# Patient Record
Sex: MALE | Race: WHITE | NOT HISPANIC OR LATINO | Employment: FULL TIME | URBAN - METROPOLITAN AREA
[De-identification: names, ages, dates, MRNs, and addresses within clinical notes are randomized per-mention and may not be internally consistent; named-entity substitution may affect disease eponyms.]

---

## 2017-07-21 ENCOUNTER — ALLSCRIPTS OFFICE VISIT (OUTPATIENT)
Dept: OTHER | Facility: OTHER | Age: 32
End: 2017-07-21

## 2018-01-12 VITALS
DIASTOLIC BLOOD PRESSURE: 86 MMHG | HEIGHT: 72 IN | BODY MASS INDEX: 37.79 KG/M2 | SYSTOLIC BLOOD PRESSURE: 128 MMHG | RESPIRATION RATE: 16 BRPM | HEART RATE: 70 BPM | WEIGHT: 279 LBS | TEMPERATURE: 97.6 F

## 2018-03-15 PROBLEM — K21.9 GE REFLUX: Status: ACTIVE | Noted: 2017-07-21

## 2018-03-15 RX ORDER — RANITIDINE 150 MG/1
1 TABLET ORAL DAILY PRN
COMMUNITY
Start: 2017-07-21 | End: 2019-09-16 | Stop reason: SDUPTHER

## 2018-03-16 ENCOUNTER — OFFICE VISIT (OUTPATIENT)
Dept: FAMILY MEDICINE CLINIC | Facility: CLINIC | Age: 33
End: 2018-03-16
Payer: COMMERCIAL

## 2018-03-16 VITALS
HEART RATE: 84 BPM | WEIGHT: 299 LBS | HEIGHT: 72 IN | BODY MASS INDEX: 40.5 KG/M2 | TEMPERATURE: 97.6 F | DIASTOLIC BLOOD PRESSURE: 84 MMHG | RESPIRATION RATE: 20 BRPM | SYSTOLIC BLOOD PRESSURE: 124 MMHG

## 2018-03-16 DIAGNOSIS — Z13.89 SCREENING FOR CARDIOVASCULAR, RESPIRATORY, AND GENITOURINARY DISEASES: ICD-10-CM

## 2018-03-16 DIAGNOSIS — K21.9 GASTROESOPHAGEAL REFLUX DISEASE, ESOPHAGITIS PRESENCE NOT SPECIFIED: Primary | ICD-10-CM

## 2018-03-16 DIAGNOSIS — G47.9 SLEEP DISORDER: ICD-10-CM

## 2018-03-16 DIAGNOSIS — Z13.6 SCREENING FOR CARDIOVASCULAR, RESPIRATORY, AND GENITOURINARY DISEASES: ICD-10-CM

## 2018-03-16 DIAGNOSIS — Z13.1 SCREENING FOR DIABETES MELLITUS (DM): ICD-10-CM

## 2018-03-16 DIAGNOSIS — Z13.83 SCREENING FOR CARDIOVASCULAR, RESPIRATORY, AND GENITOURINARY DISEASES: ICD-10-CM

## 2018-03-16 PROCEDURE — 1036F TOBACCO NON-USER: CPT | Performed by: FAMILY MEDICINE

## 2018-03-16 PROCEDURE — 3008F BODY MASS INDEX DOCD: CPT | Performed by: FAMILY MEDICINE

## 2018-03-16 PROCEDURE — 99214 OFFICE O/P EST MOD 30 MIN: CPT | Performed by: FAMILY MEDICINE

## 2018-03-16 RX ORDER — OMEPRAZOLE 40 MG/1
40 CAPSULE, DELAYED RELEASE ORAL DAILY
Qty: 30 CAPSULE | Refills: 5 | Status: SHIPPED | OUTPATIENT
Start: 2018-03-16 | End: 2019-01-11 | Stop reason: SDUPTHER

## 2018-03-16 RX ORDER — OMEPRAZOLE 20 MG/1
20 CAPSULE, DELAYED RELEASE ORAL DAILY
COMMUNITY
End: 2018-03-16 | Stop reason: SDUPTHER

## 2019-01-11 DIAGNOSIS — K21.9 GASTROESOPHAGEAL REFLUX DISEASE, ESOPHAGITIS PRESENCE NOT SPECIFIED: ICD-10-CM

## 2019-01-11 RX ORDER — OMEPRAZOLE 40 MG/1
CAPSULE, DELAYED RELEASE ORAL
Qty: 30 CAPSULE | Refills: 0 | Status: SHIPPED | OUTPATIENT
Start: 2019-01-11 | End: 2019-03-28 | Stop reason: SDUPTHER

## 2019-03-28 DIAGNOSIS — K21.9 GASTROESOPHAGEAL REFLUX DISEASE, ESOPHAGITIS PRESENCE NOT SPECIFIED: ICD-10-CM

## 2019-03-28 RX ORDER — OMEPRAZOLE 40 MG/1
40 CAPSULE, DELAYED RELEASE ORAL DAILY
Qty: 30 CAPSULE | Refills: 0 | Status: SHIPPED | OUTPATIENT
Start: 2019-03-28 | End: 2019-03-28 | Stop reason: SDUPTHER

## 2019-03-28 RX ORDER — OMEPRAZOLE 40 MG/1
40 CAPSULE, DELAYED RELEASE ORAL DAILY
Qty: 90 CAPSULE | Refills: 0 | Status: SHIPPED | OUTPATIENT
Start: 2019-03-28 | End: 2019-06-29 | Stop reason: SDUPTHER

## 2019-03-28 NOTE — TELEPHONE ENCOUNTER
Pt scheduled for Tuesday 4/2 wants to know if you can now call in 90 day supply since his insurance wont pay for 30 day supplies of this medication

## 2019-04-03 ENCOUNTER — TELEPHONE (OUTPATIENT)
Dept: FAMILY MEDICINE CLINIC | Facility: CLINIC | Age: 34
End: 2019-04-03

## 2019-06-29 DIAGNOSIS — K21.9 GASTROESOPHAGEAL REFLUX DISEASE, ESOPHAGITIS PRESENCE NOT SPECIFIED: ICD-10-CM

## 2019-06-29 RX ORDER — OMEPRAZOLE 40 MG/1
40 CAPSULE, DELAYED RELEASE ORAL DAILY
Qty: 30 CAPSULE | Refills: 0 | Status: SHIPPED | OUTPATIENT
Start: 2019-06-29 | End: 2019-09-16 | Stop reason: SDUPTHER

## 2019-06-29 RX ORDER — OMEPRAZOLE 40 MG/1
CAPSULE, DELAYED RELEASE ORAL
Qty: 30 CAPSULE | Refills: 0 | Status: SHIPPED | OUTPATIENT
Start: 2019-06-29 | End: 2019-06-29 | Stop reason: SDUPTHER

## 2019-07-17 DIAGNOSIS — K21.9 GASTROESOPHAGEAL REFLUX DISEASE, ESOPHAGITIS PRESENCE NOT SPECIFIED: ICD-10-CM

## 2019-07-17 RX ORDER — OMEPRAZOLE 40 MG/1
40 CAPSULE, DELAYED RELEASE ORAL DAILY
Qty: 90 CAPSULE | Refills: 1 | OUTPATIENT
Start: 2019-07-17

## 2019-07-21 DIAGNOSIS — K21.9 GASTROESOPHAGEAL REFLUX DISEASE, ESOPHAGITIS PRESENCE NOT SPECIFIED: ICD-10-CM

## 2019-07-21 RX ORDER — OMEPRAZOLE 40 MG/1
CAPSULE, DELAYED RELEASE ORAL
Qty: 90 CAPSULE | Refills: 0 | OUTPATIENT
Start: 2019-07-21

## 2019-08-07 ENCOUNTER — TELEPHONE (OUTPATIENT)
Dept: FAMILY MEDICINE CLINIC | Facility: CLINIC | Age: 34
End: 2019-08-07

## 2019-08-07 NOTE — TELEPHONE ENCOUNTER
LMOM advising patient know that he will need to call office to schedule a f/u appointment in order to get anymore refills     Lillie Hansen MA

## 2019-08-07 NOTE — TELEPHONE ENCOUNTER
Received a fax from [address] [facility] for a refill on pts Omeprazole  Last refill was refused because pt needs a follow up and has not been seen  We will need to call to have him schedule an appointment   iRc Cortés

## 2019-09-16 ENCOUNTER — OFFICE VISIT (OUTPATIENT)
Dept: FAMILY MEDICINE CLINIC | Facility: CLINIC | Age: 34
End: 2019-09-16
Payer: COMMERCIAL

## 2019-09-16 VITALS
TEMPERATURE: 97.8 F | DIASTOLIC BLOOD PRESSURE: 96 MMHG | HEIGHT: 72 IN | RESPIRATION RATE: 16 BRPM | BODY MASS INDEX: 39.55 KG/M2 | WEIGHT: 292 LBS | HEART RATE: 60 BPM | SYSTOLIC BLOOD PRESSURE: 124 MMHG

## 2019-09-16 DIAGNOSIS — Z13.89 SCREENING FOR CARDIOVASCULAR, RESPIRATORY, AND GENITOURINARY DISEASES: ICD-10-CM

## 2019-09-16 DIAGNOSIS — Z00.00 HEALTHCARE MAINTENANCE: Primary | ICD-10-CM

## 2019-09-16 DIAGNOSIS — Z13.83 SCREENING FOR CARDIOVASCULAR, RESPIRATORY, AND GENITOURINARY DISEASES: ICD-10-CM

## 2019-09-16 DIAGNOSIS — R03.0 ELEVATED BP WITHOUT DIAGNOSIS OF HYPERTENSION: ICD-10-CM

## 2019-09-16 DIAGNOSIS — Z13.1 SCREENING FOR DIABETES MELLITUS (DM): ICD-10-CM

## 2019-09-16 DIAGNOSIS — K21.9 GASTROESOPHAGEAL REFLUX DISEASE, ESOPHAGITIS PRESENCE NOT SPECIFIED: ICD-10-CM

## 2019-09-16 DIAGNOSIS — E66.9 OBESITY (BMI 30-39.9): ICD-10-CM

## 2019-09-16 DIAGNOSIS — G47.9 SLEEP DISORDER: ICD-10-CM

## 2019-09-16 DIAGNOSIS — Z13.6 SCREENING FOR CARDIOVASCULAR, RESPIRATORY, AND GENITOURINARY DISEASES: ICD-10-CM

## 2019-09-16 PROCEDURE — 99395 PREV VISIT EST AGE 18-39: CPT | Performed by: FAMILY MEDICINE

## 2019-09-16 RX ORDER — OMEPRAZOLE 40 MG/1
40 CAPSULE, DELAYED RELEASE ORAL DAILY
Qty: 90 CAPSULE | Refills: 1 | Status: SHIPPED | OUTPATIENT
Start: 2019-09-16 | End: 2020-09-28

## 2019-09-16 RX ORDER — RANITIDINE 150 MG/1
150 TABLET ORAL 2 TIMES DAILY
Qty: 180 TABLET | Refills: 1 | Status: SHIPPED | OUTPATIENT
Start: 2019-09-16 | End: 2020-05-15

## 2019-09-16 NOTE — PROGRESS NOTES
Assessment/Plan:    No problem-specific Assessment & Plan notes found for this encounter  cpe done    Wife will monitor DBP  gerd stable but try H1B if effective and safer than ppi  bmi aware  BMI Counseling: Body mass index is 39 88 kg/m²  Discussed the patient's BMI with him  The BMI is above normal  Nutrition recommendations include decreasing overall calorie intake  Agreeable to sleep study referral    Recent data suggesting increased risk of ischemic CVA and chronic kidney damage with PPI use was advised  Diagnoses and all orders for this visit:    Healthcare maintenance    Sleep disorder  -     Ambulatory referral to Sleep Medicine; Future    Gastroesophageal reflux disease, esophagitis presence not specified  -     ranitidine (ZANTAC) 150 mg tablet; Take 1 tablet (150 mg total) by mouth 2 (two) times a day  -     omeprazole (PriLOSEC) 40 MG capsule; Take 1 capsule (40 mg total) by mouth daily    Obesity (BMI 30-39 9)  -     TSH, 3rd generation; Future    Screening for cardiovascular, respiratory, and genitourinary diseases  -     Lipid Panel with Direct LDL reflex; Future    Screening for diabetes mellitus (DM)  -     Comprehensive metabolic panel; Future    Elevated BP without diagnosis of hypertension        Return if symptoms worsen or fail to improve  Subjective:      Patient ID: Kari Tirado is a 26 y o  male  Chief Complaint   Patient presents with    Physical Exam     Sean Whyte       HPI  Wt issues discussed  Not counting calories  Wt up/down  No exercise program  Kids ages 4, 15, 13  Has carbs  No caffeine  fam hx or reviewed  Wife notes witnessed apneas    The following portions of the patient's history were reviewed and updated as appropriate: allergies, current medications, past family history, past medical history, past social history, past surgical history and problem list     Review of Systems   Respiratory: Negative for shortness of breath      Cardiovascular: Negative for chest pain    Gastrointestinal: Negative for blood in stool, constipation and diarrhea  Current Outpatient Medications   Medication Sig Dispense Refill    omeprazole (PriLOSEC) 40 MG capsule Take 1 capsule (40 mg total) by mouth daily 90 capsule 1    ranitidine (ZANTAC) 150 mg tablet Take 1 tablet (150 mg total) by mouth 2 (two) times a day 180 tablet 1     No current facility-administered medications for this visit  Objective:    /96   Pulse 60   Temp 97 8 °F (36 6 °C)   Resp 16   Ht 5' 11 75" (1 822 m)   Wt 132 kg (292 lb)   BMI 39 88 kg/m²        Physical Exam   Constitutional: He appears well-developed  No distress  HENT:   Head: Normocephalic  Right Ear: External ear normal    Left Ear: External ear normal    Nose: Nose normal    Mouth/Throat: Oropharynx is clear and moist  No oropharyngeal exudate  Eyes: Conjunctivae are normal  No scleral icterus  Neck: Neck supple  No thyromegaly present  Cardiovascular: Normal rate, regular rhythm, normal heart sounds and intact distal pulses  No murmur heard  Pulmonary/Chest: Effort normal  No respiratory distress  He has no wheezes  Abdominal: Soft  Bowel sounds are normal  He exhibits no distension and no mass  There is no tenderness  There is no guarding  No hernia  Genitourinary: Penis normal    Musculoskeletal: He exhibits no edema or deformity  Lymphadenopathy:     He has no cervical adenopathy  Neurological: He is alert  He displays normal reflexes  He exhibits normal muscle tone  Skin: Skin is warm and dry  No rash noted  No pallor  Psychiatric: He has a normal mood and affect  His behavior is normal  Thought content normal    Nursing note and vitals reviewed               Kamini Wright DO

## 2019-09-23 PROBLEM — Z91.89 FRAMINGHAM CARDIAC RISK <10% IN NEXT 10 YEARS: Status: ACTIVE | Noted: 2019-09-23

## 2019-09-23 LAB
ALBUMIN SERPL-MCNC: 4.7 G/DL (ref 3.5–5.5)
ALBUMIN/GLOB SERPL: 1.7 {RATIO} (ref 1.2–2.2)
ALP SERPL-CCNC: 125 IU/L (ref 39–117)
ALT SERPL-CCNC: 24 IU/L (ref 0–44)
AST SERPL-CCNC: 15 IU/L (ref 0–40)
BILIRUB SERPL-MCNC: 0.6 MG/DL (ref 0–1.2)
BUN SERPL-MCNC: 18 MG/DL (ref 6–20)
BUN/CREAT SERPL: 16 (ref 9–20)
CALCIUM SERPL-MCNC: 9.3 MG/DL (ref 8.7–10.2)
CHLORIDE SERPL-SCNC: 98 MMOL/L (ref 96–106)
CHOLEST SERPL-MCNC: 245 MG/DL (ref 100–199)
CO2 SERPL-SCNC: 22 MMOL/L (ref 20–29)
CREAT SERPL-MCNC: 1.13 MG/DL (ref 0.76–1.27)
GLOBULIN SER-MCNC: 2.7 G/DL (ref 1.5–4.5)
GLUCOSE SERPL-MCNC: 97 MG/DL (ref 65–99)
HDLC SERPL-MCNC: 51 MG/DL
LDLC SERPL CALC-MCNC: 176 MG/DL (ref 0–99)
MICRODELETION SYND BLD/T FISH: NORMAL
POTASSIUM SERPL-SCNC: 4.5 MMOL/L (ref 3.5–5.2)
PROT SERPL-MCNC: 7.4 G/DL (ref 6–8.5)
SL AMB EGFR AFRICAN AMERICAN: 97 ML/MIN/1.73
SL AMB EGFR NON AFRICAN AMERICAN: 84 ML/MIN/1.73
SODIUM SERPL-SCNC: 134 MMOL/L (ref 134–144)
TRIGL SERPL-MCNC: 90 MG/DL (ref 0–149)
TSH SERPL DL<=0.005 MIU/L-ACNC: 2.28 UIU/ML (ref 0.45–4.5)

## 2020-04-30 ENCOUNTER — TELEPHONE (OUTPATIENT)
Dept: FAMILY MEDICINE CLINIC | Facility: CLINIC | Age: 35
End: 2020-04-30

## 2020-04-30 ENCOUNTER — TELEMEDICINE (OUTPATIENT)
Dept: FAMILY MEDICINE CLINIC | Facility: CLINIC | Age: 35
End: 2020-04-30
Payer: COMMERCIAL

## 2020-04-30 VITALS
BODY MASS INDEX: 41.99 KG/M2 | HEIGHT: 72 IN | HEART RATE: 76 BPM | WEIGHT: 310 LBS | DIASTOLIC BLOOD PRESSURE: 94 MMHG | RESPIRATION RATE: 20 BRPM | TEMPERATURE: 98.8 F | SYSTOLIC BLOOD PRESSURE: 144 MMHG

## 2020-04-30 DIAGNOSIS — I10 ESSENTIAL HYPERTENSION: Primary | ICD-10-CM

## 2020-04-30 DIAGNOSIS — E78.5 HYPERLIPIDEMIA, UNSPECIFIED HYPERLIPIDEMIA TYPE: ICD-10-CM

## 2020-04-30 PROCEDURE — 3080F DIAST BP >= 90 MM HG: CPT | Performed by: NURSE PRACTITIONER

## 2020-04-30 PROCEDURE — 3008F BODY MASS INDEX DOCD: CPT | Performed by: NURSE PRACTITIONER

## 2020-04-30 PROCEDURE — 1036F TOBACCO NON-USER: CPT | Performed by: NURSE PRACTITIONER

## 2020-04-30 PROCEDURE — 99214 OFFICE O/P EST MOD 30 MIN: CPT | Performed by: NURSE PRACTITIONER

## 2020-04-30 PROCEDURE — 3077F SYST BP >= 140 MM HG: CPT | Performed by: NURSE PRACTITIONER

## 2020-04-30 RX ORDER — LOSARTAN POTASSIUM 50 MG/1
50 TABLET ORAL DAILY
Qty: 30 TABLET | Refills: 1 | Status: SHIPPED | OUTPATIENT
Start: 2020-04-30 | End: 2020-05-15 | Stop reason: SDUPTHER

## 2020-05-15 ENCOUNTER — OFFICE VISIT (OUTPATIENT)
Dept: FAMILY MEDICINE CLINIC | Facility: CLINIC | Age: 35
End: 2020-05-15
Payer: COMMERCIAL

## 2020-05-15 ENCOUNTER — TELEPHONE (OUTPATIENT)
Dept: FAMILY MEDICINE CLINIC | Facility: CLINIC | Age: 35
End: 2020-05-15

## 2020-05-15 VITALS
RESPIRATION RATE: 18 BRPM | HEIGHT: 72 IN | WEIGHT: 304.8 LBS | TEMPERATURE: 98.2 F | OXYGEN SATURATION: 98 % | HEART RATE: 70 BPM | SYSTOLIC BLOOD PRESSURE: 114 MMHG | BODY MASS INDEX: 41.28 KG/M2 | DIASTOLIC BLOOD PRESSURE: 70 MMHG

## 2020-05-15 DIAGNOSIS — E78.5 HYPERLIPIDEMIA, UNSPECIFIED HYPERLIPIDEMIA TYPE: ICD-10-CM

## 2020-05-15 DIAGNOSIS — K21.9 GASTROESOPHAGEAL REFLUX DISEASE, ESOPHAGITIS PRESENCE NOT SPECIFIED: ICD-10-CM

## 2020-05-15 DIAGNOSIS — E66.01 MORBID OBESITY (HCC): ICD-10-CM

## 2020-05-15 DIAGNOSIS — I10 ESSENTIAL HYPERTENSION: Primary | ICD-10-CM

## 2020-05-15 PROBLEM — E66.9 OBESITY (BMI 30-39.9): Status: RESOLVED | Noted: 2019-09-16 | Resolved: 2020-05-15

## 2020-05-15 PROCEDURE — 3008F BODY MASS INDEX DOCD: CPT | Performed by: FAMILY MEDICINE

## 2020-05-15 PROCEDURE — 99214 OFFICE O/P EST MOD 30 MIN: CPT | Performed by: FAMILY MEDICINE

## 2020-05-15 PROCEDURE — 1036F TOBACCO NON-USER: CPT | Performed by: FAMILY MEDICINE

## 2020-05-15 PROCEDURE — 3078F DIAST BP <80 MM HG: CPT | Performed by: FAMILY MEDICINE

## 2020-05-15 PROCEDURE — 3074F SYST BP LT 130 MM HG: CPT | Performed by: FAMILY MEDICINE

## 2020-05-15 RX ORDER — LOSARTAN POTASSIUM 50 MG/1
50 TABLET ORAL DAILY
Qty: 30 TABLET | Refills: 5 | Status: SHIPPED | OUTPATIENT
Start: 2020-05-15 | End: 2020-05-18

## 2020-05-18 DIAGNOSIS — I10 ESSENTIAL HYPERTENSION: ICD-10-CM

## 2020-05-18 RX ORDER — LOSARTAN POTASSIUM 50 MG/1
TABLET ORAL
Qty: 30 TABLET | Refills: 5 | Status: SHIPPED | OUTPATIENT
Start: 2020-05-18 | End: 2020-12-22 | Stop reason: SDUPTHER

## 2020-08-17 LAB
BUN SERPL-MCNC: 27 MG/DL (ref 6–20)
BUN/CREAT SERPL: 25 (ref 9–20)
CALCIUM SERPL-MCNC: 9.6 MG/DL (ref 8.7–10.2)
CHLORIDE SERPL-SCNC: 102 MMOL/L (ref 96–106)
CO2 SERPL-SCNC: 22 MMOL/L (ref 20–29)
CREAT SERPL-MCNC: 1.1 MG/DL (ref 0.76–1.27)
GLUCOSE SERPL-MCNC: 101 MG/DL (ref 65–99)
POTASSIUM SERPL-SCNC: 4.8 MMOL/L (ref 3.5–5.2)
SL AMB EGFR AFRICAN AMERICAN: 99 ML/MIN/1.73
SL AMB EGFR NON AFRICAN AMERICAN: 86 ML/MIN/1.73
SODIUM SERPL-SCNC: 139 MMOL/L (ref 134–144)

## 2020-09-22 ENCOUNTER — TELEPHONE (OUTPATIENT)
Dept: FAMILY MEDICINE CLINIC | Facility: CLINIC | Age: 35
End: 2020-09-22

## 2020-09-28 DIAGNOSIS — K21.9 GASTROESOPHAGEAL REFLUX DISEASE, ESOPHAGITIS PRESENCE NOT SPECIFIED: ICD-10-CM

## 2020-09-28 RX ORDER — OMEPRAZOLE 40 MG/1
CAPSULE, DELAYED RELEASE ORAL
Qty: 30 CAPSULE | Refills: 0 | Status: SHIPPED | OUTPATIENT
Start: 2020-09-28 | End: 2020-12-07 | Stop reason: SDUPTHER

## 2020-12-07 DIAGNOSIS — K21.9 GASTROESOPHAGEAL REFLUX DISEASE: ICD-10-CM

## 2020-12-07 RX ORDER — OMEPRAZOLE 40 MG/1
40 CAPSULE, DELAYED RELEASE ORAL DAILY
Qty: 30 CAPSULE | Refills: 0 | Status: SHIPPED | OUTPATIENT
Start: 2020-12-07 | End: 2021-01-04

## 2020-12-22 DIAGNOSIS — I10 ESSENTIAL HYPERTENSION: ICD-10-CM

## 2020-12-24 RX ORDER — LOSARTAN POTASSIUM 50 MG/1
50 TABLET ORAL DAILY
Qty: 30 TABLET | Refills: 0 | Status: SHIPPED | OUTPATIENT
Start: 2020-12-24 | End: 2021-01-29

## 2021-01-04 DIAGNOSIS — K21.9 GASTROESOPHAGEAL REFLUX DISEASE: ICD-10-CM

## 2021-01-04 RX ORDER — OMEPRAZOLE 40 MG/1
CAPSULE, DELAYED RELEASE ORAL
Qty: 30 CAPSULE | Refills: 3 | Status: SHIPPED | OUTPATIENT
Start: 2021-01-04 | End: 2021-05-19

## 2021-01-29 DIAGNOSIS — I10 ESSENTIAL HYPERTENSION: ICD-10-CM

## 2021-01-29 RX ORDER — LOSARTAN POTASSIUM 50 MG/1
TABLET ORAL
Qty: 90 TABLET | Refills: 0 | Status: SHIPPED | OUTPATIENT
Start: 2021-01-29 | End: 2021-02-08 | Stop reason: SDUPTHER

## 2021-02-06 RX ORDER — CHOLESTYRAMINE 4 G/9G
POWDER, FOR SUSPENSION ORAL AS NEEDED
COMMUNITY
Start: 2020-10-31 | End: 2021-08-09

## 2021-02-08 ENCOUNTER — OFFICE VISIT (OUTPATIENT)
Dept: FAMILY MEDICINE CLINIC | Facility: CLINIC | Age: 36
End: 2021-02-08
Payer: COMMERCIAL

## 2021-02-08 VITALS
WEIGHT: 315 LBS | SYSTOLIC BLOOD PRESSURE: 128 MMHG | RESPIRATION RATE: 18 BRPM | TEMPERATURE: 97 F | HEART RATE: 68 BPM | DIASTOLIC BLOOD PRESSURE: 80 MMHG | BODY MASS INDEX: 42.66 KG/M2 | HEIGHT: 72 IN

## 2021-02-08 DIAGNOSIS — Z13.6 SCREENING FOR CARDIOVASCULAR, RESPIRATORY, AND GENITOURINARY DISEASES: ICD-10-CM

## 2021-02-08 DIAGNOSIS — Z13.89 SCREENING FOR CARDIOVASCULAR, RESPIRATORY, AND GENITOURINARY DISEASES: ICD-10-CM

## 2021-02-08 DIAGNOSIS — R73.03 PREDIABETES: ICD-10-CM

## 2021-02-08 DIAGNOSIS — R19.4 FREQUENT BOWEL MOVEMENTS: ICD-10-CM

## 2021-02-08 DIAGNOSIS — K21.9 GASTROESOPHAGEAL REFLUX DISEASE, UNSPECIFIED WHETHER ESOPHAGITIS PRESENT: ICD-10-CM

## 2021-02-08 DIAGNOSIS — E66.01 MORBID OBESITY (HCC): Primary | ICD-10-CM

## 2021-02-08 DIAGNOSIS — I10 ESSENTIAL HYPERTENSION: ICD-10-CM

## 2021-02-08 DIAGNOSIS — Z13.83 SCREENING FOR CARDIOVASCULAR, RESPIRATORY, AND GENITOURINARY DISEASES: ICD-10-CM

## 2021-02-08 PROBLEM — E78.5 HYPERLIPIDEMIA: Status: RESOLVED | Noted: 2020-04-30 | Resolved: 2021-02-08

## 2021-02-08 PROCEDURE — 1036F TOBACCO NON-USER: CPT | Performed by: FAMILY MEDICINE

## 2021-02-08 PROCEDURE — 3079F DIAST BP 80-89 MM HG: CPT | Performed by: FAMILY MEDICINE

## 2021-02-08 PROCEDURE — 3725F SCREEN DEPRESSION PERFORMED: CPT | Performed by: FAMILY MEDICINE

## 2021-02-08 PROCEDURE — 99214 OFFICE O/P EST MOD 30 MIN: CPT | Performed by: FAMILY MEDICINE

## 2021-02-08 PROCEDURE — 3074F SYST BP LT 130 MM HG: CPT | Performed by: FAMILY MEDICINE

## 2021-02-08 PROCEDURE — 3008F BODY MASS INDEX DOCD: CPT | Performed by: FAMILY MEDICINE

## 2021-02-08 RX ORDER — LOSARTAN POTASSIUM 50 MG/1
50 TABLET ORAL DAILY
Qty: 90 TABLET | Refills: 0 | Status: SHIPPED | OUTPATIENT
Start: 2021-02-08 | End: 2021-07-26

## 2021-02-08 NOTE — PROGRESS NOTES
Assessment/Plan:    No problem-specific Assessment & Plan notes found for this encounter  Work on [employer] and [employer], did have success on own in past  Prediabetes advised, limit carbs  Labs soon and q6m, if borderline then he wants to try more aggressive TLC    GERD, long term PPI per gi  PUD per pt    Frequent stool, had colonoscopy, uses questran prn travel  Stable for now  Dax Langley in insurance plan? ? May not have been at time since $120/visit    htn stable  Mail order rx if requested   Diagnoses and all orders for this visit:    Morbid obesity ([facility] [address] )    BMI 40 0-44 9, adult [facility])    Essential hypertension  -     Comprehensive metabolic panel; Future  -     losartan (COZAAR) 50 mg tablet; Take 1 tablet (50 mg total) by mouth daily    Prediabetes  -     Comprehensive metabolic panel; Future  -     Hemoglobin A1C; Future  -     Hemoglobin A1C; Future    Screening for cardiovascular, respiratory, and genitourinary diseases  -     Lipid Panel with Direct LDL reflex; Future    Gastroesophageal reflux disease, unspecified whether esophagitis present    Frequent bowel movements    Other orders  -     cholestyramine (QUESTRAN) 4 g packet; 1 PACKET IN WATER OR JUICE DAILY              Return in about 6 months (around 8/8/2021) for Annual physical     Subjective:      Patient ID: Odilon Ness is a 38 y o  male      Chief Complaint   Patient presents with    Follow-up     bp check ss,lpn       HPI  Trying to eat healthy  Infrequent exercise  bmi and wt is up  abd larger  Lost wt with keto  Felt better at 24  fam hx of htn    Works for [employer]  Had EGD  Dr Dax Langley  Not getting answers  Frequent stool  Had colonoscopy  Told to take PPI    Questran prn for freq stools  Typically bm 2x/d    The following portions of the patient's history were reviewed and updated as appropriate: allergies, current medications, past family history, past medical history, past social history, past surgical history and problem list     Review of Systems   Constitutional: Positive for unexpected weight change  Respiratory: Negative for shortness of breath  Current Outpatient Medications   Medication Sig Dispense Refill    cholestyramine (QUESTRAN) 4 g packet 1 PACKET IN WATER OR JUICE DAILY      losartan (COZAAR) 50 mg tablet Take 1 tablet (50 mg total) by mouth daily 90 tablet 0    omeprazole (PriLOSEC) 40 MG capsule TAKE 1 CAPSULE BY MOUTH EVERY DAY 30 capsule 3     No current facility-administered medications for this visit  Objective:    /80 Comment: large cuff  Pulse 68   Temp (!) 97 °F (36 1 °C)   Resp 18   Ht 5' 11 75" (1 822 m)   Wt (!) 144 kg (317 lb)   BMI 43 29 kg/m²        Physical Exam  Vitals signs and nursing note reviewed  Constitutional:       Appearance: He is well-developed  He is obese  He is not ill-appearing  HENT:      Head: Normocephalic  Mouth/Throat:      Mouth: Mucous membranes are moist       Pharynx: Oropharynx is clear  Eyes:      General: No scleral icterus  Conjunctiva/sclera: Conjunctivae normal    Neck:      Musculoskeletal: Neck supple  No muscular tenderness  Cardiovascular:      Rate and Rhythm: Normal rate and regular rhythm  Heart sounds: No murmur  Pulmonary:      Effort: Pulmonary effort is normal  No respiratory distress  Breath sounds: No wheezing  Abdominal:      General: There is no distension  Palpations: Abdomen is soft  Tenderness: There is no abdominal tenderness  Musculoskeletal:         General: No deformity  Skin:     General: Skin is warm and dry  Coloration: Skin is not jaundiced or pale  Neurological:      Mental Status: He is alert  Motor: No weakness  Gait: Gait normal    Psychiatric:         Mood and Affect: Mood normal          Behavior: Behavior normal          Thought Content: Thought content normal          BMI Counseling: Body mass index is 43 29 kg/m²   The BMI is above normal  Nutrition recommendations include decreasing portion sizes and moderation in carbohydrate intake  Exercise recommendations include exercising 3-5 times per week  No pharmacotherapy was ordered                Tatiana Goodpasture, DO

## 2021-02-09 DIAGNOSIS — G47.9 SLEEP DISORDER: Primary | ICD-10-CM

## 2021-03-30 DIAGNOSIS — Z23 ENCOUNTER FOR IMMUNIZATION: ICD-10-CM

## 2021-04-01 ENCOUNTER — IMMUNIZATIONS (OUTPATIENT)
Dept: FAMILY MEDICINE CLINIC | Facility: HOSPITAL | Age: 36
End: 2021-04-01

## 2021-04-01 DIAGNOSIS — Z23 ENCOUNTER FOR IMMUNIZATION: Primary | ICD-10-CM

## 2021-04-01 PROCEDURE — 91300 SARS-COV-2 / COVID-19 MRNA VACCINE (PFIZER-BIONTECH) 30 MCG: CPT

## 2021-04-01 PROCEDURE — 0001A SARS-COV-2 / COVID-19 MRNA VACCINE (PFIZER-BIONTECH) 30 MCG: CPT

## 2021-04-06 ENCOUNTER — OFFICE VISIT (OUTPATIENT)
Dept: PULMONOLOGY | Facility: MEDICAL CENTER | Age: 36
End: 2021-04-06
Payer: COMMERCIAL

## 2021-04-06 DIAGNOSIS — E66.01 MORBID OBESITY (HCC): ICD-10-CM

## 2021-04-06 DIAGNOSIS — I10 ESSENTIAL HYPERTENSION: ICD-10-CM

## 2021-04-06 DIAGNOSIS — R29.818 SUSPECTED SLEEP APNEA: Primary | ICD-10-CM

## 2021-04-06 DIAGNOSIS — G47.9 SLEEP DISORDER: ICD-10-CM

## 2021-04-06 PROCEDURE — 99244 OFF/OP CNSLTJ NEW/EST MOD 40: CPT | Performed by: INTERNAL MEDICINE

## 2021-04-06 PROCEDURE — 1036F TOBACCO NON-USER: CPT | Performed by: INTERNAL MEDICINE

## 2021-04-06 RX ORDER — AMOXICILLIN 500 MG/1
CAPSULE ORAL
COMMUNITY
Start: 2021-02-08 | End: 2021-06-09

## 2021-04-06 NOTE — PROGRESS NOTES
Sleep Consultation   Hazel Bell 38 y o  male MRN: [id number]      Reason for consultation: LENORE    Requesting physician: Dr Fara Arrington    Assessment/Plan  1  Suspected sleep apnea  Assessment & Plan:  · Renay Cancino is at very high risk for obstructive sleep apnea given his body mass index of 43, Mallampati score 4, history of snoring and witnessed apneas, [place] Scale of 16/24 and early age hypertension  · I would like to obtain a home sleep study testing as soon as possible  · Counseled him for the importance of getting screening and treatment of obstructive sleep apnea if applicable and the consequences of untreated LENORE on cardiovascular and cerebrovascular disease    Orders:  -     Home Study; Future    2  Essential hypertension  Assessment & Plan:  Untreated LENORE is risk for uncontrolled hypertension      3  Morbid obesity ([address] )  Assessment & Plan:  As detailed above counseling for lifestyle modifications      4  Sleep disorder    5  BMI 40 0-44 9, adult [facility])  Assessment & Plan:  Possibly complicated with hypertension and obstructive sleep apnea management as detailed above            History of Present Illness   HPI:  Hazel Bell is a 38 y o  male with PMHx as below who comes in for evaluation of referred to us here for possible underlying obstructive sleep apnea    Patient works in [employer] he has been also feeling unrefreshed he has been witnessed loudly snoring at night he was to bed around 10 falls asleep immediately wakes up at 6:00 a m  he has 2-3 awakenings during the night for using the bathroom feels sleepy during the day especially after lunch he does not take naps, his wife is with him that he snores loudly and stops breathing while he is sleeping he has frequent headache, teeth grinding, uncomfortable feeling in his legs possible kicking during sleep and urinary frequency he also has heartburn during sleep sour bitter taste upon awakening, sleep talking nightmares acting out dreams and as a kid he had bedwetting  He drinks coffee about 12 hours per day and soda he denies smoking denies alcohol other than occasionally and denies recreational drugs his [place] Scale is 16/24 today and his stop Marce Dainel is 7/8       Review of Systems      Genitourinary need to urinate more than twice a night   Cardiology none   Gastrointestinal frequent heartburn/acid reflux   Neurology frequent headaches and awaken with headache   Constitutional weight change   Integumentary none   Psychiatry none   Musculoskeletal joint pain and muscle aches   Pulmonary shortness of breath with activity and snoring   ENT none   Endocrine frequent urination   Hematological none               Historical Information   Past Medical History:   Diagnosis Date    Acquired ankle/foot deformity     LAST ASSESSED 24OCT2014    Hidradenitis suppurativa     ONSET [id number]    Pes planus, congenital     LAST ASSESSED 24OCT2014    Shoulder dislocation     LAST ASSESSED [id number]     Past Surgical History:   Procedure Laterality Date    BREAST SURGERY      KNEE SURGERY       Family History   Problem Relation Age of Onset    Depression Mother     Coronary artery disease Father         passed away age 34    Arthritis Family     Cancer Family         GASTRIC     Breast cancer Family     Osteoporosis Family      Social History     Socioeconomic History    Marital status: /Civil Union     Spouse name: Not on file    Number of children: Not on file    Years of education: Not on file    Highest education level: Not on file   Occupational History    Not on file   Social Needs    Financial resource strain: Not on file    Food insecurity     Worry: Not on file     Inability: Not on file   [employer] needs     Medical: Not on file     Non-medical: Not on file   Tobacco Use    Smoking status: Never Smoker    Smokeless tobacco: Never Used   Substance and Sexual Activity    Alcohol use: No    Drug use: Not on file    Sexual activity: Not on file   Lifestyle    Physical activity     Days per week: Not on file     Minutes per session: Not on file    Stress: Not on file   Relationships    Social connections     Talks on phone: Not on file     Gets together: Not on file     Attends Zoroastrianism service: Not on file     Active member of club or organization: Not on file     Attends meetings of clubs or organizations: Not on file     Relationship status: Not on file    Intimate partner violence     Fear of current or ex partner: Not on file     Emotionally abused: Not on file     Physically abused: Not on file     Forced sexual activity: Not on file   Other Topics Concern    Not on file   Social History Narrative    Not on file       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 Authorizing Provider   amoxicillin (AMOXIL) 500 mg capsule TAKE 2 CAPSULES BY MOUTH NOW THEN 1 EVERY 8 HOURS UNTIL FINISHED  2/8/21  Yes Historical Provider, MD   cholestyramine (QUESTRAN) 4 g packet 1 PACKET IN WATER OR JUICE DAILY 10/31/20  Yes Historical Provider, MD   losartan (COZAAR) 50 mg tablet Take 1 tablet (50 mg total) by mouth daily 2/8/21  Yes Noemi Buck DO   omeprazole (PriLOSEC) 40 MG capsule TAKE 1 CAPSULE BY MOUTH EVERY DAY 1/4/21  Yes Noemi Buck DO       Vitals: There were no vitals taken for this visit  ,  There is no height or weight on file to calculate BMI    Neck Circumference: 20    Physical Exam  General:  Awake alert and oriented x 3, conversant without conversational dyspnea, NAD, normal affect  HEENT:  PERRL, Sclera noninjected, nonicteric OU, Nares patent,  no craniofacial abnormalities, Mucous membranes, moist, no oral lesions, normal dentition, Mallampati class 4  NECK:  Trachea midline, no accessory muscle use, no stridor, no cervical or supraclavicular adenopathy, JVP not elevated  CARDIAC: Reg, single s1/S2, no m/r/g  PULM: CTA bilaterally no wheezing, rhonchi or rales  ABD: Normoactive bowel sounds, soft nontender, nondistended, no rebound, no rigidity, no guarding  EXT: No cyanosis, no clubbing, no edema, normal capillary refill  NEURO: no focal neurologic deficits, AAOx3, moving all extremities appropriately    Labs: I have personally reviewed pertinent lab results  , ABG: No results found for: PHART, SVT6VYL, PO2ART, VPI2SQO, F8VCSOBL, BEART, SOURCE, BNP: No results found for: BNP, CBC: No results found for: WBC, HGB, HCT, MCV, PLT, ADJUSTEDWBC, MCH, MCHC, RDW, MPV, NRBC, CMP: No results found for: SODIUM, K, CL, CO2, ANIONGAP, BUN, CREATININE, GLUCOSE, CALCIUM, AST, ALT, ALKPHOS, PROT, BILITOT, EGFR, PT/INR: No results found for: PT, INR, Troponin: No results found for: TROPONINI  No results found for: WBC, HGB, HCT, MCV, PLT   Lab Results   Component Value Date    K 4 8 08/15/2020    CO2 22 08/15/2020     08/15/2020    BUN 27 (H) 08/15/2020    CREATININE 1 10 08/15/2020     No results found for: IRON, TIBC, FERRITIN  No results found for: [id number]  No results found for: Donato Baker MD  [facility]       Portions of the record may have been created with voice recognition software  Occasional wrong word or "sound a like" substitutions may have occurred due to the inherent limitations of voice recognition software  Read the chart carefully and recognize, using context, where substitutions have occurred

## 2021-04-06 NOTE — ASSESSMENT & PLAN NOTE
· Marshall Malone is at very high risk for obstructive sleep apnea given his body mass index of 43, Mallampati score 4, history of snoring and witnessed apneas, [place] Scale of 16/24 and early age hypertension  · I would like to obtain a home sleep study testing as soon as possible  · Counseled him for the importance of getting screening and treatment of obstructive sleep apnea if applicable and the consequences of untreated LENORE on cardiovascular and cerebrovascular disease

## 2021-04-06 NOTE — PATIENT INSTRUCTIONS
Sleep Apnea   AMBULATORY CARE:   Sleep apnea  is a condition that causes you to stop breathing for 10 seconds or longer during sleep  Obstructive sleep apnea is the most common kind  The muscles and tissues around your throat relax and block air from passing through  Obesity, use of alcohol or cigarettes, or a family history are common causes  Central sleep apnea means your brain does not send signals to the muscles that control breathing  You do not take a breath even though your airway is open  Common causes include medical conditions such as heart failure, being older than 65, or use of opioids  Common signs and symptoms include the following:   · Snoring loudly, snorting, gasping or choking while you sleep, and waking up suddenly because of these    · A hard time thinking, remembering things, or focusing on your tasks the following day    · Headache or nausea    · Waking up often during the night to urinate    · Feeling sleepy, slow, and tired during the day    Call your local emergency number (911 in the [address] [facility]) if:   · You have chest pain or trouble breathing  Call your doctor if:   · You feel tired or depressed  · You have trouble staying awake during the day  · You have trouble thinking clearly  · You have questions or concerns about your condition or care  How sleep apnea is diagnosed:   · Your healthcare provider will ask about your signs and symptoms, when they began, and how bad they are  He or she may ask about medical conditions you have and what medicines you take  Tell your healthcare provider if you smoke and if anyone in your family has sleep apnea  Your healthcare provider may ask the person who sleeps beside you about your signs  · You may need to wear a device that monitors the oxygen level in your blood while you sleep  You may need to have a sleep study (polysomnography) if you have daytime sleepiness   A sleep study helps show your brain, heart, and respiratory system are working during sleep  Sleep studies may monitor the stages of sleep, oxygen levels, body position, eye movement, and snoring  Treatment  depends on the kind of sleep apnea you have:  · A machine  may be used to help you get more air during sleep  A mask may be placed over your nose and mouth, or just your nose  The mask is hooked to the machine  You will get air through the mask  ? A continuous positive airway pressure (CPAP) machine  is used to keep your airway open during sleep  The machine blows a gentle stream of air into the mask when you breathe  This helps keep your airway open so you can breathe more regularly  Extra oxygen may be given through the machine  ? A bilevel positive airway pressure (BiPAP) machine  gives air but lowers the pressure when you breathe out  ? An adaptive servo-ventilator  is a machine that only gives air when it senses you are not breathing  · A mouth device  that looks like a mouth guard stops your tongue and other tissues from blocking airflow  · Surgery  may be needed to remove extra tissues that block your mouth, throat, or nose  Manage or prevent sleep apnea:   · Do not smoke  Nicotine and other chemicals in cigarettes and cigars can cause lung damage  Ask your healthcare provider for information if you currently smoke and need help to quit  E-cigarettes or smokeless tobacco still contain nicotine  Talk to your healthcare provider before you use these products  · Do not drink alcohol or take sedative medicine before you go to sleep  Alcohol and sedatives can relax the muscles and tissues around your throat  This can block the airflow to your lungs  · Maintain a healthy weight  Your healthcare provider can tell you what weight is healthy for you  He or she can help you create a weight loss plan, if needed  The plan will include healthy foods and regular exercise to help you reach your healthy weight   Exercise can also help you sleep and may reduce stress  · Sleep on your side or use pillows designed to prevent sleep apnea  This prevents your tongue or other tissues from blocking your throat  You can also raise the head of your bed  Follow up with your doctor as directed: You may need to have blood tests during your follow-up visits  You will need to work with your healthcare provider to find the right breathing support equipment and settings for you  Write down your questions so you remember to ask them during your visits  © Copyright [address] Information is for End User's use only and may not be sold, redistributed or otherwise used for commercial purposes  All illustrations and images included in CareNotes® are the copyrighted property of A D A M , Inc  or [employer]   The above information is an  only  It is not intended as medical advice for individual conditions or treatments  Talk to your doctor, nurse or pharmacist before following any medical regimen to see if it is safe and effective for you  Sleep Study   AMBULATORY CARE:   What you need to know about a sleep study:  A sleep study is a test to learn if you have a sleep-related breathing disorder (SRBD)  A common example is obstructive sleep apnea  You may need a sleep study if you wake up often during the night, sleepwalk, or have night terrors  Seizures in your sleep, a movement or nerve disorder, or narcolepsy are also reasons for the test  A sleep study may also be called a polysomnography (PSG)  How to prepare for a sleep study:  Healthcare providers will put electrodes on you  These are sticky pads connected to wires  They record snoring, heartbeat, oxygen levels, and body movements while you are asleep  A sleep study may also be done at your home  Healthcare providers will tell you how to do a home sleep study  You may need to go to a sleep center before your home test to have electrodes put on   You will be given a portable monitor to take home for this test   What will happen during the sleep study: Your sleep study may be done as a single night or split night test  For a single night test, your sleep will be monitored by healthcare providers all night  Split night testing is done if you stop breathing at times during the first few hours of the test  Your healthcare provider may have you use a breathing machine called a CPAP for the rest of your test  CPAP is continuous positive airway pressure  The CPAP machine blows a gentle stream of air into a mask placed over your nose and mouth  The split night test with CPAP may help your healthcare provider know if this treatment will help your SRBD  Contact your healthcare provider if:   · You have questions or concerns about your test results, condition, or care  Follow up with your healthcare provider as directed: You will need to return to get the results of your PSG  You may need more PSG tests to see if treatment for your SRBD is working  Write down your questions so you remember to ask them during your visits  © Copyright [address] Information is for End User's use only and may not be sold, redistributed or otherwise used for commercial purposes  All illustrations and images included in CareNotes® are the copyrighted property of [employer]  or [address] [place], [place] [address]alvin   The above information is an  only  It is not intended as medical advice for individual conditions or treatments  Talk to your doctor, nurse or pharmacist before following any medical regimen to see if it is safe and effective for you

## 2021-04-22 ENCOUNTER — IMMUNIZATIONS (OUTPATIENT)
Dept: FAMILY MEDICINE CLINIC | Facility: HOSPITAL | Age: 36
End: 2021-04-22

## 2021-04-22 DIAGNOSIS — Z23 ENCOUNTER FOR IMMUNIZATION: Primary | ICD-10-CM

## 2021-04-22 PROCEDURE — 0002A SARS-COV-2 / COVID-19 MRNA VACCINE (PFIZER-BIONTECH) 30 MCG: CPT

## 2021-04-22 PROCEDURE — 91300 SARS-COV-2 / COVID-19 MRNA VACCINE (PFIZER-BIONTECH) 30 MCG: CPT

## 2021-05-19 DIAGNOSIS — K21.9 GASTROESOPHAGEAL REFLUX DISEASE: ICD-10-CM

## 2021-05-19 RX ORDER — OMEPRAZOLE 40 MG/1
CAPSULE, DELAYED RELEASE ORAL
Qty: 30 CAPSULE | Refills: 3 | Status: SHIPPED | OUTPATIENT
Start: 2021-05-19 | End: 2021-10-05

## 2021-06-09 ENCOUNTER — OFFICE VISIT (OUTPATIENT)
Dept: FAMILY MEDICINE CLINIC | Facility: CLINIC | Age: 36
End: 2021-06-09
Payer: COMMERCIAL

## 2021-06-09 ENCOUNTER — TELEPHONE (OUTPATIENT)
Dept: FAMILY MEDICINE CLINIC | Facility: CLINIC | Age: 36
End: 2021-06-09

## 2021-06-09 ENCOUNTER — HOSPITAL ENCOUNTER (OUTPATIENT)
Dept: SLEEP CENTER | Facility: CLINIC | Age: 36
Discharge: HOME | End: 2021-06-09
Payer: COMMERCIAL

## 2021-06-09 VITALS
WEIGHT: 286 LBS | HEIGHT: 72 IN | HEART RATE: 98 BPM | TEMPERATURE: 98.3 F | SYSTOLIC BLOOD PRESSURE: 118 MMHG | DIASTOLIC BLOOD PRESSURE: 82 MMHG | BODY MASS INDEX: 38.74 KG/M2 | OXYGEN SATURATION: 97 %

## 2021-06-09 DIAGNOSIS — Z82.41 FAMILY HISTORY OF DEATH DUE TO HEART PROBLEM AT AGE YOUNGER THAN 50 YEARS: ICD-10-CM

## 2021-06-09 DIAGNOSIS — K21.9 GASTROESOPHAGEAL REFLUX DISEASE, UNSPECIFIED WHETHER ESOPHAGITIS PRESENT: ICD-10-CM

## 2021-06-09 DIAGNOSIS — R29.818 SUSPECTED SLEEP APNEA: ICD-10-CM

## 2021-06-09 DIAGNOSIS — I10 ESSENTIAL HYPERTENSION: ICD-10-CM

## 2021-06-09 DIAGNOSIS — R07.89 CHEST PRESSURE: Primary | ICD-10-CM

## 2021-06-09 PROCEDURE — 93000 ELECTROCARDIOGRAM COMPLETE: CPT | Performed by: NURSE PRACTITIONER

## 2021-06-09 PROCEDURE — 99214 OFFICE O/P EST MOD 30 MIN: CPT | Performed by: NURSE PRACTITIONER

## 2021-06-09 PROCEDURE — 1036F TOBACCO NON-USER: CPT | Performed by: NURSE PRACTITIONER

## 2021-06-09 PROCEDURE — G0399 HOME SLEEP TEST/TYPE 3 PORTA: HCPCS

## 2021-06-09 NOTE — PROGRESS NOTES
Assessment/Plan:    EKG sinus arrhythmia, no acute ischemic or ectopy  Given family history, recommended cardiology eval for stress test   Recommended he add Pepcid BID  Consder GI for endoscopy if cardiac workup is negative  Pt to go for his routine labs tomorrow    1  Chest pressure  -     POCT ECG  -     Ambulatory referral to Cardiology; Future    2  Essential hypertension  Assessment & Plan:  Well controlled on Losartan 50mg  3  Gastroesophageal reflux disease, unspecified whether esophagitis present  Assessment & Plan:  Pt has still been experiencing symptoms despite Omeprazole  This may be the cause of his acute symptoms  Recommended he add Pepcid BID  Consider GI eval if cardiac workup is negative  4  BMI 40 0-44 9, adult ([facility] [address] )    5  Family history of death due to heart problem at age younger than 48 years          Patient Instructions   Pepcid twice daily with meals over the counter  Fasting labs tomorrow  Call to schedule f/u with cardiology      No follow-ups on file  Subjective:      Patient ID: Velvet Diaz is a 38 y o  male  Chief Complaint   Patient presents with    Headache     times this AM    Dizziness    heavy chest     sas/cma       Here today with complaints of chest heaviness that started this morning when he woke up  Reports he has tingling in his arms and headaches  Reports need to urinate immediately after drinking  Feels "foggy" and motions feel delayed, like drinking ETOH  Feels a little nauseas now, but just ate  No significant SOB  Reports tingling in his arm was this morning while using the bathroom  Take Prilosec daily for reflux secondary to hiatal hernia  Still has occasional problems, especially with greasy foods  Has noticed this with oliva before, and had this with his dinner last night  Reports his father passed away at age 34 d/t a heart attack, but was also a known drug user          The following portions of the patient's history were reviewed and updated as appropriate: allergies, current medications, past family history, past medical history, past social history, past surgical history and problem list     Review of Systems   Constitutional: Negative  Respiratory: Positive for chest tightness  Negative for cough, shortness of breath and wheezing  Cardiovascular: Negative for palpitations and leg swelling  Gastrointestinal: Positive for nausea  Negative for abdominal pain, diarrhea and vomiting  Genitourinary: Negative  Neurological: Negative  Current Outpatient Medications   Medication Sig Dispense Refill    cholestyramine (QUESTRAN) 4 g packet 1 PACKET IN WATER OR JUICE DAILY      losartan (COZAAR) 50 mg tablet Take 1 tablet (50 mg total) by mouth daily 90 tablet 0    omeprazole (PriLOSEC) 40 MG capsule TAKE 1 CAPSULE BY MOUTH EVERY DAY 30 capsule 3     No current facility-administered medications for this visit  Objective:    /82   Pulse 98   Temp 98 3 °F (36 8 °C)   Ht 5' 11 75" (1 822 m)   Wt 130 kg (286 lb)   SpO2 97%   BMI 39 06 kg/m²        Physical Exam  Vitals signs and nursing note reviewed  Constitutional:       Appearance: Normal appearance  He is well-developed  He is obese  HENT:      Head: Normocephalic and atraumatic  Right Ear: Tympanic membrane, ear canal and external ear normal       Left Ear: Tympanic membrane, ear canal and external ear normal       Nose: No mucosal edema or rhinorrhea  Mouth/Throat:      Pharynx: Uvula midline  Eyes:      Conjunctiva/sclera: Conjunctivae normal    Neck:      Musculoskeletal: Neck supple  No edema  Thyroid: No thyromegaly  Vascular: No carotid bruit  Cardiovascular:      Rate and Rhythm: Normal rate and regular rhythm  Heart sounds: Normal heart sounds  No murmur  Pulmonary:      Effort: Pulmonary effort is normal       Breath sounds: Normal breath sounds     Abdominal:      General: Bowel sounds are normal  There is no distension  Palpations: There is no hepatomegaly or splenomegaly  Tenderness: There is no abdominal tenderness  Lymphadenopathy:      Cervical:      Right cervical: No superficial cervical adenopathy  Left cervical: No superficial cervical adenopathy  Skin:     General: Skin is warm and dry  Capillary Refill: Capillary refill takes less than 2 seconds  Findings: No rash     Psychiatric:         Mood and Affect: Mood normal          Behavior: Behavior normal                 ANASTASIA Garcia

## 2021-06-09 NOTE — ASSESSMENT & PLAN NOTE
Pt has still been experiencing symptoms despite Omeprazole  This may be the cause of his acute symptoms  Recommended he add Pepcid BID  Consider GI eval if cardiac workup is negative

## 2021-06-09 NOTE — TELEPHONE ENCOUNTER
Woke up today with strange feeling in chest   As day went on he feels tightness in his chest and tired    TRIAGE

## 2021-06-09 NOTE — PATIENT INSTRUCTIONS
Pepcid twice daily with meals over the counter     Fasting labs tomorrow  Call to schedule f/u with cardiology

## 2021-06-09 NOTE — TELEPHONE ENCOUNTER
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

## 2021-06-10 ENCOUNTER — CONSULT (OUTPATIENT)
Dept: CARDIOLOGY CLINIC | Facility: CLINIC | Age: 36
End: 2021-06-10
Payer: COMMERCIAL

## 2021-06-10 VITALS
WEIGHT: 285 LBS | SYSTOLIC BLOOD PRESSURE: 110 MMHG | HEART RATE: 78 BPM | HEIGHT: 72 IN | OXYGEN SATURATION: 96 % | TEMPERATURE: 97.6 F | DIASTOLIC BLOOD PRESSURE: 80 MMHG | RESPIRATION RATE: 18 BRPM | BODY MASS INDEX: 38.6 KG/M2

## 2021-06-10 DIAGNOSIS — R07.89 CHEST PRESSURE: Primary | ICD-10-CM

## 2021-06-10 LAB
ALBUMIN SERPL-MCNC: 4.8 G/DL (ref 4–5)
ALBUMIN/GLOB SERPL: 1.8 {RATIO} (ref 1.2–2.2)
ALP SERPL-CCNC: 118 IU/L (ref 48–121)
ALT SERPL-CCNC: 21 IU/L (ref 0–44)
AST SERPL-CCNC: 14 IU/L (ref 0–40)
BILIRUB SERPL-MCNC: 0.3 MG/DL (ref 0–1.2)
BUN SERPL-MCNC: 21 MG/DL (ref 6–20)
BUN/CREAT SERPL: 17 (ref 9–20)
CALCIUM SERPL-MCNC: 9.5 MG/DL (ref 8.7–10.2)
CHLORIDE SERPL-SCNC: 100 MMOL/L (ref 96–106)
CO2 SERPL-SCNC: 25 MMOL/L (ref 20–29)
CREAT SERPL-MCNC: 1.22 MG/DL (ref 0.76–1.27)
GLOBULIN SER-MCNC: 2.6 G/DL (ref 1.5–4.5)
GLUCOSE SERPL-MCNC: 101 MG/DL (ref 65–99)
HBA1C MFR BLD: 4.9 % (ref 4.8–5.6)
POTASSIUM SERPL-SCNC: 4.5 MMOL/L (ref 3.5–5.2)
PROT SERPL-MCNC: 7.4 G/DL (ref 6–8.5)
SL AMB EGFR AFRICAN AMERICAN: 87 ML/MIN/1.73
SL AMB EGFR NON AFRICAN AMERICAN: 75 ML/MIN/1.73
SODIUM SERPL-SCNC: 137 MMOL/L (ref 134–144)

## 2021-06-10 PROCEDURE — 3079F DIAST BP 80-89 MM HG: CPT | Performed by: INTERNAL MEDICINE

## 2021-06-10 PROCEDURE — 99243 OFF/OP CNSLTJ NEW/EST LOW 30: CPT | Performed by: INTERNAL MEDICINE

## 2021-06-10 PROCEDURE — 93000 ELECTROCARDIOGRAM COMPLETE: CPT | Performed by: INTERNAL MEDICINE

## 2021-06-10 PROCEDURE — 3008F BODY MASS INDEX DOCD: CPT | Performed by: INTERNAL MEDICINE

## 2021-06-10 PROCEDURE — 3074F SYST BP LT 130 MM HG: CPT | Performed by: INTERNAL MEDICINE

## 2021-06-10 PROCEDURE — 1036F TOBACCO NON-USER: CPT | Performed by: INTERNAL MEDICINE

## 2021-06-10 NOTE — PROGRESS NOTES
Consultation - Cardiology Office  [facility]  Kenia Rock 38 y o  male MRN: [id number]  : 1985  Unit/Bed#:  Encounter: [id number]      ASSESSMENT:  Chest pressure  Patient had symptom of chest pressure yesterday with some tingling and numbness in both his arms  He is concerned because he has a strong family history of coronary artery disease    Hypertension  BP is well controlled at 110/80  Currently on losartan 50 mg daily    Obesity:  BMI is 39 06    GERD    Family history of heart problems  Father had MI/SCD at age 34 yrs    Dyslipidemia  In 2019: , triglyceride 90, HDL 51  Repeat labs done today      RECOMMENDATIONS:  Check result of repeat lipid panel to decide if a lipid-lowering medications I indicated  In the meantime patient should concentrate on regular cardiovascular exercise and low-cholesterol diet  Exercise stress test to rule out significant Coronary artery disease/myocardial ischemia            Thank you for your consultation  If you have any question please call me at [phone]      Primary Care Physician Requesting Consult: Jocelin Sam DO      Reason for Consult / Principal Problem:  Chest pressure        HPI :     Kenia Rock is a 38y o  year old male who was referred by primary care doctor for evaluation of chest pressure  Yesterday patient experienced significant chest pressure with numbness and tingling in both his arms  He has a strong family history of coronary artery disease  His father  at age 34 of an MI  Patient himself has hypertension which is well controlled with medications  He also had a significantly elevated LDL of 175 in 2019  Repeat labs were sent today, results are pending  REVIEW OF SYSTEMS:      Constitutional: Negative for activity change, appetite change, chills, fatigue, fever and unexpected weight change  HENT: Negative for congestion, sore throat and trouble swallowing      Eyes: Negative for discharge and redness  Respiratory: Negative for apnea, cough, shortness of breath and wheezing  Cardiovascular:  Positive for chest pressure, negative shortness of breath, palpitations and leg swelling  Gastrointestinal: Negative for abdominal distention, abdominal pain, anal bleeding, blood in stool, constipation, diarrhea, nausea and vomiting  Endocrine: Negative for polydipsia, polyphagia and polyuria  Genitourinary: Negative for difficulty urinating, dysuria, flank pain and hematuria  Musculoskeletal: Negative for arthralgias, myalgias and neck stiffness  Skin: Negative for pallor and rash  Allergic/Immunologic: Negative for environmental allergies  Neurological: Negative for dizziness, syncope, light-headedness, numbness and headaches  Positive for tingling and numbness in his arms   Hematological: Negative for adenopathy  Does not bruise/bleed easily  Psychiatric/Behavioral: Negative for confusion and hallucinations  The patient is not nervous/anxious        Historical Information   Past Medical History:   Diagnosis Date    Acquired ankle/foot deformity     LAST ASSESSED 24OCT2014    Hidradenitis suppurativa     ONSET [id number]    Pes planus, congenital     LAST ASSESSED 24OCT2014    Shoulder dislocation     LAST ASSESSED [id number]     Past Surgical History:   Procedure Laterality Date    BREAST SURGERY      KNEE SURGERY       Social History     Substance and Sexual Activity   Alcohol Use Yes    Frequency: Monthly or less     Social History     Substance and Sexual Activity   Drug Use Never     Social History     Tobacco Use   Smoking Status Never Smoker   Smokeless Tobacco Never Used     Family History:   Family History   Problem Relation Age of Onset    Depression Mother     Coronary artery disease Father         passed away age 34    Arthritis Family     Cancer Family         GASTRIC     Breast cancer Family     Osteoporosis Family        Meds/Allergies     No Known Allergies    Current Outpatient Medications:     cholestyramine (QUESTRAN) 4 g packet, 1 PACKET IN WATER OR JUICE DAILY, Disp: , Rfl:     losartan (COZAAR) 50 mg tablet, Take 1 tablet (50 mg total) by mouth daily, Disp: 90 tablet, Rfl: 0    omeprazole (PriLOSEC) 40 MG capsule, TAKE 1 CAPSULE BY MOUTH EVERY DAY, Disp: 30 capsule, Rfl: 3    Vitals:  BP is 110/80 mmHg  HR 68/Min  BP Readings from Last 3 Encounters:   06/09/21 118/82   02/08/21 128/80   05/15/20 114/70         PHYSICAL EXAMINATION:  Neurologic:  Alert & oriented x 3, no new focal deficits, Not in any acute distress,  Constitutional:  Well developed, well nourished, non-toxic appearance   Eyes:  Pupil equal and reacting to light, conjunctiva normal, No JVP, No LNP   HENT:  Atraumatic, oropharynx moist, Neck- normal range of motion, no tenderness,  Neck supple   Respiratory:  Bilateral air entry, mostly clear to auscultation  Cardiovascular: S1-S2 regular with a I/VI systolic murmur   GI:  Soft, nondistended, normal bowel sounds, nontender, no hepatosplenomegaly appreciated  Musculoskeletal:  No edema, no tenderness, no deformities  Skin:  Well hydrated, no rash   Lymphatic:  No lymphadenopathy noted   Extremities:  No edema and distal pulses are present    Diagnostic Studies Review Cardio:      EKG:  Normal sinus rhythm, heart rate 68 per minute    Cardiac testing:   No results found for this or any previous visit         Imaging:  Chest X-Ray:   No Chest XR results available for this patient  CT-scan of the chest:     No CTA results available for this patient    Lab Review   No results found for: WBC, HGB, HCT, MCV, RDW, PLT  BMP:  Lab Results   Component Value Date    SODIUM 139 08/15/2020    K 4 8 08/15/2020     08/15/2020    CO2 22 08/15/2020    BUN 27 (H) 08/15/2020    CREATININE 1 10 08/15/2020    GLUC 101 (H) 08/15/2020     LFT:  Lab Results   Component Value Date    AST 15 09/20/2019    ALT 24 09/20/2019    TP 7 4 09/20/2019    ALB 4 7 09/20/2019      No results found for: [id number]  No components found for: TSH3  No results found for: HGBA1C  Lipid Profile:   Lab Results   Component Value Date    CHOLESTEROL 245 (H) 09/20/2019    HDL 51 09/20/2019    LDLCALC 176 (H) 09/20/2019    TRIG 90 09/20/2019     Lab Results   Component Value Date    CHOLESTEROL 245 (H) 09/20/2019     No results found for: CKTOTAL, CKMB, CKMBINDEX, TROPONINI  No results found for: NTBNP   No results found for this or any previous visit (from the past 672 hour(s))  Dr Umer Temple MD, [facility] [place]      "This note has been constructed using a voice recognition system  Therefore there may be syntax, spelling, and/or grammatical errors   Please call if you have any questions  "

## 2021-06-10 NOTE — PROGRESS NOTES
Home Sleep Study Documentation    Pre-Sleep Home Study:    Set-up and instructions performed by: JUANA Guallpa, BANDAR    Technician performed demonstration for Patient: yes    Return demonstration performed by Patient: yes    Written instructions provided to Patient: yes    Patient signed consent form: yes        Post-Sleep Home Study:    Additional comments by Patient: none    Home Sleep Study Failed:no:    Failure reason: N/A    Reported or Detected: N/A    Scored by: OG Mauro RPSGT

## 2021-06-14 DIAGNOSIS — G47.33 OSA (OBSTRUCTIVE SLEEP APNEA): Primary | ICD-10-CM

## 2021-06-14 PROCEDURE — 95806 SLEEP STUDY UNATT&RESP EFFT: CPT | Performed by: INTERNAL MEDICINE

## 2021-06-15 ENCOUNTER — TELEPHONE (OUTPATIENT)
Dept: SLEEP CENTER | Facility: CLINIC | Age: 36
End: 2021-06-15

## 2021-06-15 NOTE — TELEPHONE ENCOUNTER
Left message for patient to call office    Sleep study reveals moderate LENORE, APAP ordered      Dr Lolis Gallagher pulmonary patient

## 2021-06-16 NOTE — TELEPHONE ENCOUNTER
Spoke with patient, advised above  Patient would like to use [facility]    Please send information to [facility]  Patient already has follow up scheduled 8/3/2021 with DR Narciso Zhao, advised this will need to fall within the 31-90 day range

## 2021-06-29 ENCOUNTER — HOSPITAL ENCOUNTER (OUTPATIENT)
Dept: NON INVASIVE DIAGNOSTICS | Facility: HOSPITAL | Age: 36
Discharge: HOME | End: 2021-06-29
Attending: INTERNAL MEDICINE
Payer: COMMERCIAL

## 2021-06-29 DIAGNOSIS — R07.89 CHEST PRESSURE: ICD-10-CM

## 2021-06-29 LAB
CHEST PAIN STATEMENT: NORMAL
MAX DIASTOLIC BP: 90 MMHG
MAX HEART RATE: 169 BPM
MAX PREDICTED HEART RATE: 183 BPM
MAX. SYSTOLIC BP: 180 MMHG
PROTOCOL NAME: NORMAL
TARGET HR FORMULA: NORMAL
TEST INDICATION: NORMAL
TIME IN EXERCISE PHASE: NORMAL

## 2021-06-29 PROCEDURE — 93018 CV STRESS TEST I&R ONLY: CPT | Performed by: INTERNAL MEDICINE

## 2021-06-29 PROCEDURE — 93016 CV STRESS TEST SUPVJ ONLY: CPT | Performed by: INTERNAL MEDICINE

## 2021-06-29 PROCEDURE — 93017 CV STRESS TEST TRACING ONLY: CPT

## 2021-06-30 ENCOUNTER — TELEPHONE (OUTPATIENT)
Dept: CARDIOLOGY CLINIC | Facility: CLINIC | Age: 36
End: 2021-06-30

## 2021-06-30 NOTE — TELEPHONE ENCOUNTER
----- Message from Marna Fabry, MD sent at 6/30/2021  9:14 AM EDT -----  Please inform the patient that the stress test showed NO evidence of any significant blockage in the blood vessels of your heart

## 2021-07-02 ENCOUNTER — OFFICE VISIT (OUTPATIENT)
Dept: PODIATRY | Facility: CLINIC | Age: 36
End: 2021-07-02
Payer: COMMERCIAL

## 2021-07-02 VITALS — WEIGHT: 285 LBS | BODY MASS INDEX: 38.6 KG/M2 | HEIGHT: 72 IN | RESPIRATION RATE: 17 BRPM

## 2021-07-02 DIAGNOSIS — M79.672 PAIN IN BOTH FEET: Primary | ICD-10-CM

## 2021-07-02 DIAGNOSIS — L03.039 PARONYCHIA OF TOENAIL, UNSPECIFIED LATERALITY: ICD-10-CM

## 2021-07-02 DIAGNOSIS — B35.1 ONYCHOMYCOSIS: ICD-10-CM

## 2021-07-02 DIAGNOSIS — B35.9 DERMATOPHYTOSIS: ICD-10-CM

## 2021-07-02 DIAGNOSIS — M79.671 PAIN IN BOTH FEET: Primary | ICD-10-CM

## 2021-07-02 PROCEDURE — 99202 OFFICE O/P NEW SF 15 MIN: CPT | Performed by: PODIATRIST

## 2021-07-02 RX ORDER — TERBINAFINE HYDROCHLORIDE 250 MG/1
250 TABLET ORAL DAILY
Qty: 30 TABLET | Refills: 0 | Status: SHIPPED | OUTPATIENT
Start: 2021-07-02 | End: 2021-07-16

## 2021-07-02 RX ORDER — KETOCONAZOLE 20 MG/G
CREAM TOPICAL DAILY
Qty: 60 G | Refills: 1 | Status: SHIPPED | OUTPATIENT
Start: 2021-07-02 | End: 2021-08-09

## 2021-07-02 NOTE — PROGRESS NOTES
Assessment/Plan:   pain upon ambulation  Dermatophytosis bilateral   Paronychia of nail  This is secondary to mycosis  Plan  Foot exam performed  Patient family educated on condition  Patient be started both oral topical antifungals  In addition patient will spray shoes with Lysol  And take biotin as directed  Diagnoses and all orders for this visit:    Pain in both feet    Paronychia of toenail, unspecified laterality    Dermatophytosis  -     terbinafine (LamISIL) 250 mg tablet; Take 1 tablet (250 mg total) by mouth daily  -     ketoconazole (NIZORAL) 2 % cream; Apply topically daily    Onychomycosis  -     terbinafine (LamISIL) 250 mg tablet; Take 1 tablet (250 mg total) by mouth daily  -     ketoconazole (NIZORAL) 2 % cream; Apply topically daily          Subjective:  Patient presents for evaluation of skin deformity of foot bilateral   He has had dry scaly skin of the feet for the last several years  They are itchy  He is also concerned with his toenails      No Known Allergies      Current Outpatient Medications:     cholestyramine (QUESTRAN) 4 g packet, 1 PACKET IN WATER OR JUICE DAILY, Disp: , Rfl:     ketoconazole (NIZORAL) 2 % cream, Apply topically daily, Disp: 60 g, Rfl: 1    losartan (COZAAR) 50 mg tablet, Take 1 tablet (50 mg total) by mouth daily, Disp: 90 tablet, Rfl: 0    omeprazole (PriLOSEC) 40 MG capsule, TAKE 1 CAPSULE BY MOUTH EVERY DAY, Disp: 30 capsule, Rfl: 3    terbinafine (LamISIL) 250 mg tablet, Take 1 tablet (250 mg total) by mouth daily, Disp: 30 tablet, Rfl: 0    Patient Active Problem List   Diagnosis    GE reflux    Morbid obesity (HCC)    Sleep disorder    Screening for cardiovascular, respiratory, and genitourinary diseases    Screening for diabetes mellitus (DM)    BMI 40 0-44 9, adult ([facility] [address] )    Healthcare maintenance    Essential hypertension    [place] cardiac risk <10% in next 10 years    Frequent bowel movements    Suspected sleep apnea    LENORE (obstructive sleep apnea)          Patient ID: Óscar Granados is a 38 y o  male  HPI    The following portions of the patient's history were reviewed and updated as appropriate:     family history includes Arthritis in his family; Breast cancer in his family; Cancer in his family; Coronary artery disease in his father; Depression in his mother; Osteoporosis in his family  reports that he has never smoked  He has never used smokeless tobacco  He reports current alcohol use  He reports that he does not use drugs  Vitals:    07/02/21 1002   Resp: 17       Review of Systems      Objective:  Patient's shoes and socks removed  Foot Exam    General  General Appearance: appears stated age and healthy   Orientation: alert and oriented to person, place, and time   Affect: appropriate       Right Foot/Ankle     Inspection and Palpation  Swelling: dorsum   Arch: pes planus  Claw Toes: fifth toe  Skin Exam: dry skin, tinea and skin changes; Neurovascular  Dorsalis pedis: 3+  Posterior tibial: 3+      Left Foot/Ankle      Inspection and Palpation  Swelling: dorsum   Arch: pes planus  Claw toes: fifth toe  Skin Exam: dry skin, tinea and skin changes; Neurovascular  Dorsalis pedis: 3+  Posterior tibial: 3+        Physical Exam  Vitals and nursing note reviewed  Constitutional:       Appearance: Normal appearance  Cardiovascular:      Rate and Rhythm: Normal rate and regular rhythm  Pulses:           Dorsalis pedis pulses are 3+ on the right side and 3+ on the left side  Posterior tibial pulses are 3+ on the right side and 3+ on the left side  Feet:      Right foot:      Skin integrity: Dry skin present  Left foot:      Skin integrity: Dry skin present  Skin:     Capillary Refill: Capillary refill takes less than 2 seconds  Comments:   Patient demonstrates xerosis of skin bilateral   This is secondary to  Dermatophytosis  Patient also has dystrophy of all nails    Hallux and 5th toe bilateral demonstrates distal mycosis  Neurological:      Mental Status: He is alert  Psychiatric:         Mood and Affect: Mood normal          Behavior: Behavior normal          Thought Content:  Thought content normal          Judgment: Judgment normal

## 2021-07-16 ENCOUNTER — OFFICE VISIT (OUTPATIENT)
Dept: FAMILY MEDICINE CLINIC | Facility: CLINIC | Age: 36
End: 2021-07-16
Payer: COMMERCIAL

## 2021-07-16 VITALS
DIASTOLIC BLOOD PRESSURE: 76 MMHG | SYSTOLIC BLOOD PRESSURE: 116 MMHG | WEIGHT: 286 LBS | HEART RATE: 61 BPM | RESPIRATION RATE: 18 BRPM | TEMPERATURE: 97.2 F | OXYGEN SATURATION: 98 % | BODY MASS INDEX: 38.74 KG/M2 | HEIGHT: 72 IN

## 2021-07-16 DIAGNOSIS — T50.905A ADVERSE EFFECT OF DRUG, INITIAL ENCOUNTER: Primary | ICD-10-CM

## 2021-07-16 DIAGNOSIS — I10 ESSENTIAL HYPERTENSION: ICD-10-CM

## 2021-07-16 PROBLEM — R29.818 SUSPECTED SLEEP APNEA: Status: RESOLVED | Noted: 2021-04-06 | Resolved: 2021-07-16

## 2021-07-16 PROCEDURE — 3008F BODY MASS INDEX DOCD: CPT | Performed by: INTERNAL MEDICINE

## 2021-07-16 PROCEDURE — 99213 OFFICE O/P EST LOW 20 MIN: CPT | Performed by: NURSE PRACTITIONER

## 2021-07-16 RX ORDER — METHYLPREDNISOLONE 4 MG/1
TABLET ORAL
Qty: 21 EACH | Refills: 0 | Status: SHIPPED | OUTPATIENT
Start: 2021-07-16 | End: 2021-08-09

## 2021-07-16 NOTE — PROGRESS NOTES
Assessment/Plan:    Allergy list updated  Advised to hold off on using Ketoconazole until rash has fully resolved  F/u for any persistent/worsening symptoms    1  Adverse effect of drug, initial encounter  -     methylPREDNISolone 4 MG tablet therapy pack; Use as directed on package    2  Essential hypertension  Assessment & Plan:  Stable on Losartan            There are no Patient Instructions on file for this visit  Return if symptoms worsen or fail to improve  Subjective:      Patient ID: Mayo Devi is a 38 y o  male  Chief Complaint   Patient presents with    Rash     all over now  started at hands [id number]       Here today for evaluation of a rash  This started a couple of days after starting Lamisil for a foot fungus  Rash initially started on his hands, which he initially thought was poison ivy  Rash has since spread to his chest back, and feet, and now his arms and legs  Took Claritin and Benadryl  No significant itching, but has some discomfort in his hands and feet  Denies chest tightness, SOB, or wheezing  Drinks carbohydrated drinks burns his throat and his lips feel dry  Last dose of Lamisil was 7/13      The following portions of the patient's history were reviewed and updated as appropriate: allergies, current medications, past family history, past medical history, past social history, past surgical history and problem list     Review of Systems   Constitutional: Negative  Respiratory: Negative  Cardiovascular: Negative  Gastrointestinal: Negative  Skin: Positive for rash           Current Outpatient Medications   Medication Sig Dispense Refill    cholestyramine (QUESTRAN) 4 g packet as needed       losartan (COZAAR) 50 mg tablet Take 1 tablet (50 mg total) by mouth daily 90 tablet 0    omeprazole (PriLOSEC) 40 MG capsule TAKE 1 CAPSULE BY MOUTH EVERY DAY 30 capsule 3    ketoconazole (NIZORAL) 2 % cream Apply topically daily (Patient not taking: Reported on 7/16/2021) 60 g 1    methylPREDNISolone 4 MG tablet therapy pack Use as directed on package 21 each 0     No current facility-administered medications for this visit  Objective:    /76   Pulse 61   Temp (!) 97 2 °F (36 2 °C)   Resp 18   Ht 5' 11 75" (1 822 m)   Wt 130 kg (286 lb)   SpO2 98%   BMI 39 06 kg/m²        Physical Exam  Vitals and nursing note reviewed  Constitutional:       Appearance: Normal appearance  He is well-developed  Cardiovascular:      Rate and Rhythm: Normal rate and regular rhythm  Heart sounds: Normal heart sounds  No murmur heard  Pulmonary:      Effort: Pulmonary effort is normal       Breath sounds: Normal breath sounds  Skin:     General: Skin is warm and dry  Comments: Diffuse erythematous macular rash involving hands, arms, chest, back, and legs  Neurological:      Mental Status: He is alert     Psychiatric:         Mood and Affect: Mood normal          Behavior: Behavior normal                 ANASTASIA Perez

## 2021-07-22 ENCOUNTER — OFFICE VISIT (OUTPATIENT)
Dept: FAMILY MEDICINE CLINIC | Facility: CLINIC | Age: 36
End: 2021-07-22
Payer: COMMERCIAL

## 2021-07-22 ENCOUNTER — TELEPHONE (OUTPATIENT)
Dept: FAMILY MEDICINE CLINIC | Facility: CLINIC | Age: 36
End: 2021-07-22

## 2021-07-22 VITALS
HEIGHT: 72 IN | RESPIRATION RATE: 16 BRPM | OXYGEN SATURATION: 98 % | BODY MASS INDEX: 37.79 KG/M2 | DIASTOLIC BLOOD PRESSURE: 84 MMHG | TEMPERATURE: 98.3 F | HEART RATE: 92 BPM | SYSTOLIC BLOOD PRESSURE: 118 MMHG | WEIGHT: 279 LBS

## 2021-07-22 DIAGNOSIS — I10 ESSENTIAL HYPERTENSION: ICD-10-CM

## 2021-07-22 DIAGNOSIS — Z88.9 DRUG ALLERGY: Primary | ICD-10-CM

## 2021-07-22 DIAGNOSIS — K21.9 GASTROESOPHAGEAL REFLUX DISEASE, UNSPECIFIED WHETHER ESOPHAGITIS PRESENT: ICD-10-CM

## 2021-07-22 DIAGNOSIS — L27.0 DRUG RASH: ICD-10-CM

## 2021-07-22 DIAGNOSIS — G47.33 OSA (OBSTRUCTIVE SLEEP APNEA): ICD-10-CM

## 2021-07-22 PROCEDURE — 99214 OFFICE O/P EST MOD 30 MIN: CPT | Performed by: NURSE PRACTITIONER

## 2021-07-22 PROCEDURE — 3079F DIAST BP 80-89 MM HG: CPT | Performed by: NURSE PRACTITIONER

## 2021-07-22 PROCEDURE — 3074F SYST BP LT 130 MM HG: CPT | Performed by: NURSE PRACTITIONER

## 2021-07-22 NOTE — TELEPHONE ENCOUNTER
Waqas Neff was seen by Merle Leger for reaction to medication  He stated the rash is getting better but his hand is peeling really bad  I wants to know if this should be a concern? He finished prednisone  Please call patient back    Thank You

## 2021-07-22 NOTE — TELEPHONE ENCOUNTER
Patient is working and needed an evening apt  I did put him in for this evening with Becca  He stated he might have to cancel if he can't leave work    NFA

## 2021-07-22 NOTE — PROGRESS NOTES
Assessment/Plan:  Discussed with patient and wife that rash is consistent with drug allergy rash at this time  Advised to start pepcid 20 mg daily with claritin 10 mg daily for next weeks  Wife stated that will get patient to dermatologist tomorrow and if not able to get appt then will office back  Will go to ER for any worsening  1  Drug allergy  -     Ambulatory referral to Dermatology; Future    2  Drug rash  -     Ambulatory referral to Dermatology; Future    3  Gastroesophageal reflux disease, unspecified whether esophagitis present  Comments:  stable with omperazole  4  Essential hypertension  Comments:  stable with current regimen    5  LENORE (obstructive sleep apnea)        Patient Instructions:  Supportive care discussed and advised  Advised to RTO for any worsening and no improvement  Follow up for no improvement and worsening of conditions  Patient advised and educated when to see immediate medical care  Return if symptoms worsen or fail to improve  Future Appointments   Date Time Provider Son Reeves   8/3/2021  3:40 PM Celia Mckeon MD [facility]   8/6/2021  9:45 AM Kian Blanco DPM [id number]   8/10/2021  5:00 PM DO COLTON Juárez [facility]           Subjective:      Patient ID: Geni Kinney is a 38 y o  male  Chief Complaint   Patient presents with    Peeling Hands     Stemming from allergic reaction to medication mz cma         Vitals:  /84   Pulse 92   Temp 98 3 °F (36 8 °C)   Resp 16   Ht 5' 11 75" (1 822 m)   Wt 127 kg (279 lb)   SpO2 98%   BMI 38 10 kg/m²     HPI  /Patient was seen in office on 7/16/2021 for drug rash secondary to lamisil  Patient developed hives all over his body which was very itchy  Stated that was started on steroids and itching is better and now concerned as his skin is peeling off from palms of his hands and soles of his feet but denies any discomfort at the area  Taking benadryl as needed too  Denies any other systemic symptoms  Denies any fever, chills, sob, wheezing and arthralgia  The following portions of the patient's history were reviewed and updated as appropriate: allergies, current medications, past family history, past medical history, past social history, past surgical history and problem list       Review of Systems   Constitutional: Negative  Respiratory: Negative  Cardiovascular: Negative  Musculoskeletal: Negative for arthralgias  Skin: Positive for rash  As noted in HPI     Neurological: Negative for dizziness, light-headedness and headaches  Objective:    Social History     Tobacco Use   Smoking Status Never Smoker   Smokeless Tobacco Never Used       Allergies: Allergies   Allergen Reactions    Terbinafine Rash         Current Outpatient Medications   Medication Sig Dispense Refill    cholestyramine (QUESTRAN) 4 g packet as needed       ketoconazole (NIZORAL) 2 % cream Apply topically daily 60 g 1    losartan (COZAAR) 50 mg tablet Take 1 tablet (50 mg total) by mouth daily 90 tablet 0    omeprazole (PriLOSEC) 40 MG capsule TAKE 1 CAPSULE BY MOUTH EVERY DAY 30 capsule 3    methylPREDNISolone 4 MG tablet therapy pack Use as directed on package (Patient not taking: Reported on 7/22/2021) 21 each 0     No current facility-administered medications for this visit  Physical Exam  Constitutional:       Appearance: Normal appearance  HENT:      Head: Normocephalic  Nose: Nose normal    Eyes:      Conjunctiva/sclera: Conjunctivae normal    Cardiovascular:      Rate and Rhythm: Normal rate and regular rhythm  Heart sounds: Normal heart sounds  Pulmonary:      Effort: Pulmonary effort is normal       Breath sounds: Normal breath sounds  Musculoskeletal:         General: No swelling or tenderness  Normal range of motion  Skin:     General: Skin is warm and dry  Findings: Rash present        Comments: Diffused erythematous macular rash noted all over his body and skin peeling noted on palms and soles of his feet without any other open skin lesions   Neurological:      Mental Status: He is alert and oriented to person, place, and time  Psychiatric:         Mood and Affect: Mood normal          Behavior: Behavior normal          Thought Content:  Thought content normal          Judgment: Judgment normal                      ANASTASIA Larry

## 2021-07-22 NOTE — TELEPHONE ENCOUNTER
Lmom for patient to call the office back and we can schedule and appt for him to see Oral Lester today

## 2021-07-23 ENCOUNTER — LAB (OUTPATIENT)
Dept: LAB | Facility: CLINIC | Age: 36
End: 2021-07-23
Payer: COMMERCIAL

## 2021-07-23 ENCOUNTER — CONSULT (OUTPATIENT)
Dept: DERMATOLOGY | Facility: CLINIC | Age: 36
End: 2021-07-23
Payer: COMMERCIAL

## 2021-07-23 VITALS — WEIGHT: 279 LBS | TEMPERATURE: 97.7 F | BODY MASS INDEX: 37.79 KG/M2 | HEIGHT: 72 IN

## 2021-07-23 DIAGNOSIS — L51.1 STEVENS-JOHNSON DISEASE (HCC): ICD-10-CM

## 2021-07-23 LAB
ALBUMIN SERPL BCP-MCNC: 4.2 G/DL (ref 3.5–5)
ALP SERPL-CCNC: 133 U/L (ref 46–116)
ALT SERPL W P-5'-P-CCNC: 35 U/L (ref 12–78)
ANION GAP SERPL CALCULATED.3IONS-SCNC: 13 MMOL/L (ref 4–13)
AST SERPL W P-5'-P-CCNC: 11 U/L (ref 5–45)
BASOPHILS # BLD AUTO: 0.09 THOUSANDS/ΜL (ref 0–0.1)
BASOPHILS NFR BLD AUTO: 1 % (ref 0–1)
BILIRUB SERPL-MCNC: 0.52 MG/DL (ref 0.2–1)
BUN SERPL-MCNC: 22 MG/DL (ref 5–25)
CALCIUM SERPL-MCNC: 9.1 MG/DL (ref 8.3–10.1)
CHLORIDE SERPL-SCNC: 100 MMOL/L (ref 100–108)
CO2 SERPL-SCNC: 25 MMOL/L (ref 21–32)
CREAT SERPL-MCNC: 1.19 MG/DL (ref 0.6–1.3)
EOSINOPHIL # BLD AUTO: 0.15 THOUSAND/ΜL (ref 0–0.61)
EOSINOPHIL NFR BLD AUTO: 1 % (ref 0–6)
ERYTHROCYTE [DISTWIDTH] IN BLOOD BY AUTOMATED COUNT: 13.4 % (ref 11.6–15.1)
GFR SERPL CREATININE-BSD FRML MDRD: 78 ML/MIN/1.73SQ M
GLUCOSE SERPL-MCNC: 85 MG/DL (ref 65–140)
HCT VFR BLD AUTO: 43.7 % (ref 36.5–49.3)
HGB BLD-MCNC: 15.2 G/DL (ref 12–17)
IMM GRANULOCYTES # BLD AUTO: 0.05 THOUSAND/UL (ref 0–0.2)
IMM GRANULOCYTES NFR BLD AUTO: 0 % (ref 0–2)
LYMPHOCYTES # BLD AUTO: 2.15 THOUSANDS/ΜL (ref 0.6–4.47)
LYMPHOCYTES NFR BLD AUTO: 18 % (ref 14–44)
MCH RBC QN AUTO: 28.8 PG (ref 26.8–34.3)
MCHC RBC AUTO-ENTMCNC: 34.8 G/DL (ref 31.4–37.4)
MCV RBC AUTO: 83 FL (ref 82–98)
MONOCYTES # BLD AUTO: 0.82 THOUSAND/ΜL (ref 0.17–1.22)
MONOCYTES NFR BLD AUTO: 7 % (ref 4–12)
NEUTROPHILS # BLD AUTO: 8.48 THOUSANDS/ΜL (ref 1.85–7.62)
NEUTS SEG NFR BLD AUTO: 73 % (ref 43–75)
NRBC BLD AUTO-RTO: 0 /100 WBCS
PLATELET # BLD AUTO: 300 THOUSANDS/UL (ref 149–390)
PMV BLD AUTO: 10.2 FL (ref 8.9–12.7)
POTASSIUM SERPL-SCNC: 4 MMOL/L (ref 3.5–5.3)
PROT SERPL-MCNC: 8.3 G/DL (ref 6.4–8.2)
RBC # BLD AUTO: 5.27 MILLION/UL (ref 3.88–5.62)
SODIUM SERPL-SCNC: 138 MMOL/L (ref 136–145)
WBC # BLD AUTO: 11.74 THOUSAND/UL (ref 4.31–10.16)

## 2021-07-23 PROCEDURE — 85025 COMPLETE CBC W/AUTO DIFF WBC: CPT

## 2021-07-23 PROCEDURE — 36415 COLL VENOUS BLD VENIPUNCTURE: CPT

## 2021-07-23 PROCEDURE — 99244 OFF/OP CNSLTJ NEW/EST MOD 40: CPT | Performed by: DERMATOLOGY

## 2021-07-23 PROCEDURE — 80053 COMPREHEN METABOLIC PANEL: CPT

## 2021-07-23 PROCEDURE — 1036F TOBACCO NON-USER: CPT | Performed by: DERMATOLOGY

## 2021-07-23 RX ORDER — FAMOTIDINE 20 MG/1
20 TABLET, FILM COATED ORAL 2 TIMES DAILY
COMMUNITY
End: 2021-08-10

## 2021-07-23 NOTE — PATIENT INSTRUCTIONS
Based on a thorough discussion of this condition and the management approach to it (including a comprehensive discussion of the known risks, side effects and potential benefits of treatment), the patient (family) agrees to implement the following specific plan:    Obtain blood work CBC and CMP   Start using triamcinolone ointment apply topically twice a day (avoid face and groin)   con

## 2021-07-23 NOTE — LETTER
July 23, 2021     Kamini Wright DO  [address] [address]    Patient: Abida Garcia   YOB: 1985   Date of Visit: 7/23/2021       Dear Dr Sylvie Alegria: Thank you for referring Abida Garcia to me for evaluation  Below are my notes for this consultation  If you have questions, please do not hesitate to call me  I look forward to following your patient along with you  Sincerely,        Rachelle Alvarez MD        CC: Laith Le DPM Viktoria Hibbs, MD  7/23/2021  5:50 PM  Sign when Signing Visit  [facility]     Patient Name: Abida Garcia  Encounter Date: 07/23/2021     Have you been cared for by a [facility] Dermatologist in the last 3 years and, if so, which one? No    · Have you traveled outside of the [address] [place] [address] in the past 3 months or outside of the [facility] area in the last 2 weeks? No     May we call your Preferred Phone number to discuss your specific medical information? Yes     May we leave a detailed message that includes your specific medical information? Yes      Today's Chief Concerns:   Concern #1:  rash   Concern #2:      Past Medical History:  Have you personally ever had or currently have any of the following? · Skin cancer (such as Melanoma, Basal Cell Carcinoma, Squamous Cell Carcinoma? (If Yes, please provide more detail)- No  · Eczema: No  · Psoriasis: No  · HIV/AIDS: No  · Hepatitis B or C: No  · Tuberculosis: No  · Systemic Immunosuppression such as Diabetes, Biologic or Immunotherapy, Chemotherapy, Organ Transplantation, Bone Marrow Transplantation (If YES, please provide more detail): No  · Radiation Treatment (If YES, please provide more detail): No  · Any other major medical conditions/concerns? (If Yes, which types)- YES, hypertension     Social History:     What is/was your primary occupation? [address] [place] [address] What are your hobbies/past-times? Family History:  Have any of your "first degree relatives" (parent, brother, sister, or child) had any of the following       · Skin cancer such as Melanoma or Merkel Cell Carcinoma or Pancreatic Cancer? YES, grandmother   · Eczema, Asthma, Hay Fever or Seasonal Allergies: YES, son:eczema   · Psoriasis or Psoriatic Arthritis: No  · Do any other medical conditions seem to run in your family? If Yes, what condition and which relatives? YES, father: heart attack age 29, mother: endometrial cancer, hyperytension    Current Medications:   (please update all dermatological medications before printing patient's AVS!)      Current Outpatient Medications:     famotidine (PEPCID) 20 mg tablet, Take 20 mg by mouth 2 (two) times a day, Disp: , Rfl:     losartan (COZAAR) 50 mg tablet, Take 1 tablet (50 mg total) by mouth daily, Disp: 90 tablet, Rfl: 0    omeprazole (PriLOSEC) 40 MG capsule, TAKE 1 CAPSULE BY MOUTH EVERY DAY, Disp: 30 capsule, Rfl: 3    cholestyramine (QUESTRAN) 4 g packet, as needed  (Patient not taking: Reported on 7/23/2021), Disp: , Rfl:     ketoconazole (NIZORAL) 2 % cream, Apply topically daily (Patient not taking: Reported on 7/23/2021), Disp: 60 g, Rfl: 1    methylPREDNISolone 4 MG tablet therapy pack, Use as directed on package (Patient not taking: Reported on 7/22/2021), Disp: 21 each, Rfl: 0    triamcinolone (KENALOG) 0 1 % ointment, Apply topically twice a day for 2 weeks, Disp: 453 6 g, Rfl: 0      Review of Systems:  Have you recently had or currently have any of the following? If YES, what are you doing for the problem? · Fever, chills or unintended weight loss: No  · Sudden loss or change in your vision: No  · Nausea, vomiting or blood in your stool: No  · Painful or swollen joints: No  · Wheezing or cough: No  · Changing mole or non-healing wound: No  · Nosebleeds: No  · Excessive sweating: No  · Easy or prolonged bleeding?   No  · Over the last 2 weeks, how often have you been bothered by the following problems? · Taking little interest or pleasure in doing things: 1 - Not at All  · Feeling down, depressed, or hopeless: 1 - Not at All  · Rapid heartbeat with epinephrine:  No    · FEMALES ONLY:    · Are you pregnant or planning to become pregnant? N/A  · Are you currently or planning to be nursing or breast feeding? N/A    · Any known allergies? Allergies   Allergen Reactions    Terbinafine Rash         Physical Exam:     Was a chaperone (Derm Clinical Assistant) present throughout the entire Physical Exam? Yes     Did the Dermatology Team specifically  the patient on the importance of a Full Skin Exam to be sure that nothing is missed clinically?  Yes}  o Did the patient ultimately request or accept a Full Skin Exam?  NO  o Did the patient specifically refuse to have the areas "under-the-bra" examined by the Dermatologist? Donavan Bethany  o Did the patient specifically refuse to have the areas "under-the-underwear" examined by the Dermatologist? YES    CONSTITUTIONAL:   Vitals:    07/23/21 1402   Temp: 97 7 °F (36 5 °C)   Weight: 127 kg (279 lb)   Height: 6' (1 829 m)         PSYCH: Normal mood and affect  EYES: Normal conjunctiva  ENT: Normal lips and oral mucosa  CARDIOVASCULAR: No edema  RESPIRATORY: Normal respirations  HEME/LYMPH/IMMUNO:  No regional lymphadenopathy except as noted below in "ASSESSMENT AND PLAN BY DIAGNOSIS"    SKIN:  FULL ORGAN SYSTEM EXAM  Hair, Scalp, Ears, Face Normal except as noted below in Assessment   Neck Normal except as noted below in Assessment   Right Arm/Hand/Fingers Normal except as noted below in Assessment   Left Arm/Hand/Fingers Normal except as noted below in Assessment   Chest/Breasts/Axillae Viewed areas Normal except as noted below in Assessment   Abdomen, Umbilicus Normal except as noted below in Assessment   Back/Spine Normal except as noted below in Assessment   Groin/Genitalia/Buttocks EXAMINED   Right Leg, Foot, Toes Normal except as noted below in Assessment   Left Leg, Foot, Toes Normal except as noted below in Assessment        Assessment and Plan by Diagnosis:    History of Present Condition:     Duration:  How long has this been an issue for you?    o  9 days ago    Location Affected:  Where on the body is this affecting you?    o  hands, arms    Quality:  Is there any bleeding, pain, itch, burning/irritation, or redness associated with the skin lesion? o  itching    Severity:  Describe any bleeding, pain, itch, burning/irritation, or redness on a scale of 1 to 10 (with 10 being the worst)  o  0   Timing:  Does this condition seem to be there pretty constantly or do you notice it more at specific times throughout the day? o  improved    Context:  Have you ever noticed that this condition seems to be associated with specific activities you do?    o  denies   Modifying Factors:    o Anything that seems to make the condition worse?    -  denies  o What have you tried to do to make the condition better?    -  last does of prednisone on 7/21, Claritin Pepcid    Associated Signs and Symptoms:  Does this skin lesion seem to be associated with any of the following:  o  SL AMB DERM SIGNS AND SYMPTOMS: Redness       NICHOLS JIMMIE DISEASE BY LAMISIL  Physical Exam:   Anatomic Location Affected:  bilateral yani of hands, bottom of feet, trunk and extremities, genital     Morphological Description:  Erythematous scaly plaques with desquamation, most notable on back, bilateral hands, feet   Pertinent Positives: sore on roof of mouth within days of starting terbinafine   Pertinent Negatives:no major mucous membrane                    Additional History of Present Condition:  Patient started lamasil (precribed by podiatrist)  9 days ago and after taking medication for 3-4 days patient developed a rash that started on his hands, and then progressed to all over his body  He was prescribed prednisone, which he stopped on 7/21/2021  Patient currently taking Claritin and Pepcid 20 mg, along with ketoconazole cream     Assessment and Plan:  Based on a thorough discussion of this condition and the management approach to it (including a comprehensive discussion of the known risks, side effects and potential benefits of treatment), the patient (family) agrees to implement the following specific plan:    Obtain blood work [address] DDx includes Marvene Vani but already resolving  No significant mucousal involvement and no systemic symptomatology  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

## 2021-07-23 NOTE — PROGRESS NOTES
[facility]     Patient Name: Óscar Granados  Encounter Date: 07/23/2021     Have you been cared for by a [facility]ologist in the last 3 years and, if so, which one? No    · Have you traveled outside of the [address] [place] [address] in the past 3 months or outside of the [facility] area in the last 2 weeks? No     May we call your Preferred Phone number to discuss your specific medical information? Yes     May we leave a detailed message that includes your specific medical information? Yes      Today's Chief Concerns:   Concern #1:  rash   Concern #2:      Past Medical History:  Have you personally ever had or currently have any of the following? · Skin cancer (such as Melanoma, Basal Cell Carcinoma, Squamous Cell Carcinoma? (If Yes, please provide more detail)- No  · Eczema: No  · Psoriasis: No  · HIV/AIDS: No  · Hepatitis B or C: No  · Tuberculosis: No  · Systemic Immunosuppression such as Diabetes, Biologic or Immunotherapy, Chemotherapy, Organ Transplantation, Bone Marrow Transplantation (If YES, please provide more detail): No  · Radiation Treatment (If YES, please provide more detail): No  · Any other major medical conditions/concerns? (If Yes, which types)- YES, hypertension     Social History:     What is/was your primary occupation? [address] [place] [address] What are your hobbies/past-times? Family History:  Have any of your "first degree relatives" (parent, brother, sister, or child) had any of the following       · Skin cancer such as Melanoma or Merkel Cell Carcinoma or Pancreatic Cancer? YES, grandmother   · Eczema, Asthma, Hay Fever or Seasonal Allergies: YES, son:eczema   · Psoriasis or Psoriatic Arthritis: No  · Do any other medical conditions seem to run in your family? If Yes, what condition and which relatives?   YES, father: heart attack age 29, mother: endometrial cancer, hyperytension    Current Medications:   (please update all dermatological medications before printing patient's AVS!)      Current Outpatient Medications:     famotidine (PEPCID) 20 mg tablet, Take 20 mg by mouth 2 (two) times a day, Disp: , Rfl:     losartan (COZAAR) 50 mg tablet, Take 1 tablet (50 mg total) by mouth daily, Disp: 90 tablet, Rfl: 0    omeprazole (PriLOSEC) 40 MG capsule, TAKE 1 CAPSULE BY MOUTH EVERY DAY, Disp: 30 capsule, Rfl: 3    cholestyramine (QUESTRAN) 4 g packet, as needed  (Patient not taking: Reported on 7/23/2021), Disp: , Rfl:     ketoconazole (NIZORAL) 2 % cream, Apply topically daily (Patient not taking: Reported on 7/23/2021), Disp: 60 g, Rfl: 1    methylPREDNISolone 4 MG tablet therapy pack, Use as directed on package (Patient not taking: Reported on 7/22/2021), Disp: 21 each, Rfl: 0    triamcinolone (KENALOG) 0 1 % ointment, Apply topically twice a day for 2 weeks, Disp: 453 6 g, Rfl: 0      Review of Systems:  Have you recently had or currently have any of the following? If YES, what are you doing for the problem? · Fever, chills or unintended weight loss: No  · Sudden loss or change in your vision: No  · Nausea, vomiting or blood in your stool: No  · Painful or swollen joints: No  · Wheezing or cough: No  · Changing mole or non-healing wound: No  · Nosebleeds: No  · Excessive sweating: No  · Easy or prolonged bleeding? No  · Over the last 2 weeks, how often have you been bothered by the following problems? · Taking little interest or pleasure in doing things: 1 - Not at All  · Feeling down, depressed, or hopeless: 1 - Not at All  · Rapid heartbeat with epinephrine:  No    · FEMALES ONLY:    · Are you pregnant or planning to become pregnant? N/A  · Are you currently or planning to be nursing or breast feeding? N/A    · Any known allergies?       Allergies   Allergen Reactions    Terbinafine Rash         Physical Exam:     Was a chaperone (Derm Clinical Assistant) present throughout the entire Physical Exam? Yes     Did the Dermatology Team specifically  the patient on the importance of a Full Skin Exam to be sure that nothing is missed clinically? Yes}  o Did the patient ultimately request or accept a Full Skin Exam?  NO  o Did the patient specifically refuse to have the areas "under-the-bra" examined by the Dermatologist? Divina Mannie  o Did the patient specifically refuse to have the areas "under-the-underwear" examined by the Dermatologist? YES    CONSTITUTIONAL:   Vitals:    07/23/21 1402   Temp: 97 7 °F (36 5 °C)   Weight: 127 kg (279 lb)   Height: 6' (1 829 m)         PSYCH: Normal mood and affect  EYES: Normal conjunctiva  ENT: Normal lips and oral mucosa  CARDIOVASCULAR: No edema  RESPIRATORY: Normal respirations  HEME/LYMPH/IMMUNO:  No regional lymphadenopathy except as noted below in "ASSESSMENT AND PLAN BY DIAGNOSIS"    SKIN:  FULL ORGAN SYSTEM EXAM  Hair, Scalp, Ears, Face Normal except as noted below in Assessment   Neck Normal except as noted below in Assessment   Right Arm/Hand/Fingers Normal except as noted below in Assessment   Left Arm/Hand/Fingers Normal except as noted below in Assessment   Chest/Breasts/Axillae Viewed areas Normal except as noted below in Assessment   Abdomen, Umbilicus Normal except as noted below in Assessment   Back/Spine Normal except as noted below in Assessment   Groin/Genitalia/Buttocks EXAMINED   Right Leg, Foot, Toes Normal except as noted below in Assessment   Left Leg, Foot, Toes Normal except as noted below in Assessment        Assessment and Plan by Diagnosis:    History of Present Condition:     Duration:  How long has this been an issue for you?    o  9 days ago    Location Affected:  Where on the body is this affecting you?    o  hands, arms    Quality:  Is there any bleeding, pain, itch, burning/irritation, or redness associated with the skin lesion?     o  itching    Severity:  Describe any bleeding, pain, itch, burning/irritation, or redness on a scale of 1 to 10 (with 10 being the worst)  o  0   Timing:  Does this condition seem to be there pretty constantly or do you notice it more at specific times throughout the day? o  improved    Context:  Have you ever noticed that this condition seems to be associated with specific activities you do?    o  denies   Modifying Factors:    o Anything that seems to make the condition worse?    -  denies  o What have you tried to do to make the condition better?    -  last does of prednisone on 7/21, Claritin, Pepcid    Associated Signs and Symptoms:  Does this skin lesion seem to be associated with any of the following:  o  SL AMB DERM SIGNS AND SYMPTOMS: Redness       NICHOLS JIMMIE DISEASE BY LAMISIL  Physical Exam:   Anatomic Location Affected:  bilateral yani of hands, bottom of feet, trunk and extremities, genital     Morphological Description:  Erythematous scaly plaques with desquamation, most notable on back, bilateral hands, feet   Pertinent Positives: sore on roof of mouth within days of starting terbinafine   Pertinent Negatives:no major mucous membrane                    Additional History of Present Condition:  Patient started lamasil (precribed by podiatrist)  9 days ago and after taking medication for 3-4 days patient developed a rash that started on his hands, and then progressed to all over his body  He was prescribed prednisone, which he stopped on 7/21/2021  Patient currently taking Claritin and Pepcid 20 mg, along with ketoconazole cream     Assessment and Plan:  Based on a thorough discussion of this condition and the management approach to it (including a comprehensive discussion of the known risks, side effects and potential benefits of treatment), the patient (family) agrees to implement the following specific plan:    Obtain blood work [address] and [address] DDx includes Indira Bo but already resolving  No significant mucousal involvement and no systemic symptomatology   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

## 2021-07-25 DIAGNOSIS — I10 ESSENTIAL HYPERTENSION: ICD-10-CM

## 2021-07-26 ENCOUNTER — TELEPHONE (OUTPATIENT)
Dept: DERMATOLOGY | Facility: CLINIC | Age: 36
End: 2021-07-26

## 2021-07-26 RX ORDER — LOSARTAN POTASSIUM 50 MG/1
TABLET ORAL
Qty: 30 TABLET | Refills: 2 | Status: SHIPPED | OUTPATIENT
Start: 2021-07-26 | End: 2021-11-05

## 2021-07-26 NOTE — RESULT ENCOUNTER NOTE
I left messages with patient and wife  I noted that labs are basically  The slight elevation is WBC is normal finding in patients who have recently taken  corticosteroids  I asked them to call derm office if any concerns

## 2021-07-27 NOTE — TELEPHONE ENCOUNTER
Patient wife called wanting to talk to Dr Morales regarding patient skin peeling and concerns with some limb swelling

## 2021-07-27 NOTE — TELEPHONE ENCOUNTER
Wife note a bit of edema in feet  Otherwise clearing  I noted feet will desquamte and resolve last   Signs indiecating secondary cellulitis were reviewed

## 2021-08-10 ENCOUNTER — OFFICE VISIT (OUTPATIENT)
Dept: FAMILY MEDICINE CLINIC | Facility: CLINIC | Age: 36
End: 2021-08-10
Payer: COMMERCIAL

## 2021-08-10 VITALS
SYSTOLIC BLOOD PRESSURE: 112 MMHG | TEMPERATURE: 98.5 F | WEIGHT: 278 LBS | HEART RATE: 84 BPM | BODY MASS INDEX: 37.65 KG/M2 | RESPIRATION RATE: 18 BRPM | HEIGHT: 72 IN | OXYGEN SATURATION: 97 % | DIASTOLIC BLOOD PRESSURE: 76 MMHG

## 2021-08-10 DIAGNOSIS — Z00.00 HEALTHCARE MAINTENANCE: Primary | ICD-10-CM

## 2021-08-10 DIAGNOSIS — Z13.89 SCREENING FOR CARDIOVASCULAR, RESPIRATORY, AND GENITOURINARY DISEASES: ICD-10-CM

## 2021-08-10 DIAGNOSIS — K21.9 GASTROESOPHAGEAL REFLUX DISEASE, UNSPECIFIED WHETHER ESOPHAGITIS PRESENT: ICD-10-CM

## 2021-08-10 DIAGNOSIS — Z13.6 SCREENING FOR CARDIOVASCULAR, RESPIRATORY, AND GENITOURINARY DISEASES: ICD-10-CM

## 2021-08-10 DIAGNOSIS — Z13.83 SCREENING FOR CARDIOVASCULAR, RESPIRATORY, AND GENITOURINARY DISEASES: ICD-10-CM

## 2021-08-10 DIAGNOSIS — E66.9 OBESITY (BMI 30-39.9): ICD-10-CM

## 2021-08-10 DIAGNOSIS — Z13.1 SCREENING FOR DIABETES MELLITUS (DM): ICD-10-CM

## 2021-08-10 DIAGNOSIS — I10 ESSENTIAL HYPERTENSION: ICD-10-CM

## 2021-08-10 PROCEDURE — 3008F BODY MASS INDEX DOCD: CPT | Performed by: DERMATOLOGY

## 2021-08-10 PROCEDURE — 99395 PREV VISIT EST AGE 18-39: CPT | Performed by: FAMILY MEDICINE

## 2021-08-10 RX ORDER — LORATADINE 10 MG/1
10 TABLET ORAL DAILY
COMMUNITY
End: 2022-03-31

## 2021-08-10 NOTE — PROGRESS NOTES
Assessment/Plan:    No problem-specific Assessment & Plan notes found for this encounter  cpe  Successful wt loss with keto, maintain wt loss ifpossible  htn stable on losartan, q6m f/u  gerd stable  Obesity w/o morbid obesity now     Diagnoses and all orders for this visit:    Healthcare maintenance    Screening for cardiovascular, respiratory, and genitourinary diseases  -     Lipid Panel with Direct LDL reflex; Future    Essential hypertension  -     Comprehensive metabolic panel; Future  -     Comprehensive metabolic panel; Future    Screening for diabetes mellitus (DM)  -     Comprehensive metabolic panel; Future    Gastroesophageal reflux disease, unspecified whether esophagitis present    Obesity (BMI 30-39  9)    Other orders  -     loratadine (CLARITIN) 10 mg tablet; Take 10 mg by mouth daily        Return in about 6 months (around 2/10/2022) for Recheck  Subjective:      Patient ID: Shari Cotto is a 38 y o  male  Chief Complaint   Patient presents with    Physical Exam     sas/cma       HPI  Lost 40#  Keto diet  Thinks he can maintain  Somewhat low carb diet  Taking bp meds  nonfasting glucose <100  Waist smaller  Not exercising yet  Plans to do more    The following portions of the patient's history were reviewed and updated as appropriate: allergies, current medications, past family history, past medical history, past social history, past surgical history and problem list     Review of Systems   Constitutional: Negative for fever  Respiratory: Negative for shortness of breath  Cardiovascular: Negative for chest pain           Current Outpatient Medications   Medication Sig Dispense Refill    loratadine (CLARITIN) 10 mg tablet Take 10 mg by mouth daily      losartan (COZAAR) 50 mg tablet TAKE 1 TABLET BY MOUTH EVERY DAY 30 tablet 2    omeprazole (PriLOSEC) 40 MG capsule TAKE 1 CAPSULE BY MOUTH EVERY DAY 30 capsule 3    triamcinolone (KENALOG) 0 1 % ointment Apply topically twice a day for 2 weeks 453 6 g 0     No current facility-administered medications for this visit  Objective:    /76   Pulse 84   Temp 98 5 °F (36 9 °C)   Resp 18   Ht 5' 11 5" (1 816 m)   Wt 126 kg (278 lb)   SpO2 97%   BMI 38 23 kg/m²        Physical Exam  Vitals and nursing note reviewed  Constitutional:       Appearance: He is well-developed  He is obese  He is not ill-appearing  HENT:      Head: Normocephalic  Right Ear: Tympanic membrane normal       Left Ear: Tympanic membrane normal    Eyes:      General: No scleral icterus  Conjunctiva/sclera: Conjunctivae normal    Cardiovascular:      Rate and Rhythm: Normal rate and regular rhythm  Heart sounds: No murmur heard  Pulmonary:      Effort: Pulmonary effort is normal  No respiratory distress  Breath sounds: No wheezing  Abdominal:      General: There is no distension  Palpations: Abdomen is soft  Tenderness: There is no abdominal tenderness  Genitourinary:     Penis: Normal        Testes: Normal    Musculoskeletal:         General: No deformity  Cervical back: Neck supple  Skin:     General: Skin is warm and dry  Coloration: Skin is not pale  Neurological:      Mental Status: He is alert  Motor: No weakness  Gait: Gait normal    Psychiatric:         Mood and Affect: Mood normal          Behavior: Behavior normal          Thought Content: Thought content normal        BMI Counseling: Body mass index is 38 23 kg/m²  The BMI is above normal  Nutrition recommendations include decreasing portion sizes and moderation in carbohydrate intake  Exercise recommendations include exercising 3-5 times per week  No pharmacotherapy was ordered                  Juan Pablo Louie DO

## 2021-10-05 DIAGNOSIS — K21.9 GASTROESOPHAGEAL REFLUX DISEASE: ICD-10-CM

## 2021-10-05 RX ORDER — OMEPRAZOLE 40 MG/1
CAPSULE, DELAYED RELEASE ORAL
Qty: 30 CAPSULE | Refills: 3 | Status: SHIPPED | OUTPATIENT
Start: 2021-10-05 | End: 2022-02-22

## 2021-11-05 DIAGNOSIS — I10 ESSENTIAL HYPERTENSION: ICD-10-CM

## 2021-11-05 RX ORDER — LOSARTAN POTASSIUM 50 MG/1
TABLET ORAL
Qty: 30 TABLET | Refills: 2 | Status: SHIPPED | OUTPATIENT
Start: 2021-11-05 | End: 2022-02-17

## 2022-02-16 DIAGNOSIS — I10 ESSENTIAL HYPERTENSION: ICD-10-CM

## 2022-02-17 RX ORDER — LOSARTAN POTASSIUM 50 MG/1
TABLET ORAL
Qty: 30 TABLET | Refills: 0 | Status: SHIPPED | OUTPATIENT
Start: 2022-02-17 | End: 2022-03-01 | Stop reason: SDUPTHER

## 2022-02-18 DIAGNOSIS — K21.9 GASTROESOPHAGEAL REFLUX DISEASE: ICD-10-CM

## 2022-02-22 RX ORDER — OMEPRAZOLE 40 MG/1
CAPSULE, DELAYED RELEASE ORAL
Qty: 30 CAPSULE | Refills: 0 | Status: SHIPPED | OUTPATIENT
Start: 2022-02-22 | End: 2022-02-25 | Stop reason: SDUPTHER

## 2022-02-25 DIAGNOSIS — K21.9 GASTROESOPHAGEAL REFLUX DISEASE: ICD-10-CM

## 2022-02-25 RX ORDER — OMEPRAZOLE 40 MG/1
40 CAPSULE, DELAYED RELEASE ORAL DAILY
Qty: 30 CAPSULE | Refills: 0 | Status: SHIPPED | OUTPATIENT
Start: 2022-02-25 | End: 2022-03-01 | Stop reason: SDUPTHER

## 2022-03-01 DIAGNOSIS — K21.9 GASTROESOPHAGEAL REFLUX DISEASE: ICD-10-CM

## 2022-03-01 DIAGNOSIS — I10 ESSENTIAL HYPERTENSION: ICD-10-CM

## 2022-03-02 RX ORDER — LOSARTAN POTASSIUM 50 MG/1
50 TABLET ORAL DAILY
Qty: 30 TABLET | Refills: 0 | Status: SHIPPED | OUTPATIENT
Start: 2022-03-02 | End: 2022-03-31 | Stop reason: SDUPTHER

## 2022-03-02 RX ORDER — OMEPRAZOLE 40 MG/1
40 CAPSULE, DELAYED RELEASE ORAL DAILY
Qty: 30 CAPSULE | Refills: 0 | Status: SHIPPED | OUTPATIENT
Start: 2022-03-02 | End: 2022-03-31 | Stop reason: SDUPTHER

## 2022-03-08 ENCOUNTER — TELEPHONE (OUTPATIENT)
Dept: FAMILY MEDICINE CLINIC | Facility: CLINIC | Age: 37
End: 2022-03-08

## 2022-03-31 ENCOUNTER — OFFICE VISIT (OUTPATIENT)
Dept: FAMILY MEDICINE CLINIC | Facility: CLINIC | Age: 37
End: 2022-03-31
Payer: COMMERCIAL

## 2022-03-31 VITALS
BODY MASS INDEX: 42.66 KG/M2 | DIASTOLIC BLOOD PRESSURE: 78 MMHG | WEIGHT: 315 LBS | HEART RATE: 88 BPM | HEIGHT: 72 IN | OXYGEN SATURATION: 97 % | TEMPERATURE: 96.1 F | RESPIRATION RATE: 16 BRPM | SYSTOLIC BLOOD PRESSURE: 118 MMHG

## 2022-03-31 DIAGNOSIS — Z00.00 HEALTHCARE MAINTENANCE: Primary | ICD-10-CM

## 2022-03-31 DIAGNOSIS — Z13.6 SCREENING FOR CARDIOVASCULAR, RESPIRATORY, AND GENITOURINARY DISEASES: ICD-10-CM

## 2022-03-31 DIAGNOSIS — Z13.83 SCREENING FOR CARDIOVASCULAR, RESPIRATORY, AND GENITOURINARY DISEASES: ICD-10-CM

## 2022-03-31 DIAGNOSIS — I10 ESSENTIAL HYPERTENSION: ICD-10-CM

## 2022-03-31 DIAGNOSIS — K21.9 GASTROESOPHAGEAL REFLUX DISEASE: ICD-10-CM

## 2022-03-31 DIAGNOSIS — E66.01 MORBID OBESITY (HCC): ICD-10-CM

## 2022-03-31 DIAGNOSIS — Z13.89 SCREENING FOR CARDIOVASCULAR, RESPIRATORY, AND GENITOURINARY DISEASES: ICD-10-CM

## 2022-03-31 DIAGNOSIS — Z13.1 SCREENING FOR DIABETES MELLITUS (DM): ICD-10-CM

## 2022-03-31 DIAGNOSIS — R73.03 PREDIABETES: ICD-10-CM

## 2022-03-31 PROCEDURE — 99395 PREV VISIT EST AGE 18-39: CPT | Performed by: FAMILY MEDICINE

## 2022-03-31 RX ORDER — OMEPRAZOLE 40 MG/1
40 CAPSULE, DELAYED RELEASE ORAL
Qty: 90 CAPSULE | Refills: 1 | Status: SHIPPED | OUTPATIENT
Start: 2022-03-31

## 2022-03-31 RX ORDER — LOSARTAN POTASSIUM 50 MG/1
50 TABLET ORAL DAILY
Qty: 90 TABLET | Refills: 1 | Status: SHIPPED | OUTPATIENT
Start: 2022-03-31

## 2022-03-31 NOTE — PROGRESS NOTES
Assessment/Plan:    No problem-specific Assessment & Plan notes found for this encounter  cpe    htn stable, f/u q6m, labs q6m    GERD, on PPI, been to gi, no barretts on bx  Try to minimize and use prn, follow low acid diet  Recent data suggesting increased risk of ischemic CVA and chronic kidney damage with PPI use was advised  bmi aware  He did have success with wt loss in past  He has plans to try again    Prediabetes aware     Diagnoses and all orders for this visit:    Healthcare maintenance    Essential hypertension  -     losartan (COZAAR) 50 mg tablet; Take 1 tablet (50 mg total) by mouth daily    Gastroesophageal reflux disease  Comments:  egd with bx neg for barretts  Orders:  -     omeprazole (PriLOSEC) 40 MG capsule; Take 1 capsule (40 mg total) by mouth daily at bedtime as needed (gerd)    Screening for cardiovascular, respiratory, and genitourinary diseases  -     Lipid Panel with Direct LDL reflex; Future    Screening for diabetes mellitus (DM)  -     Comprehensive metabolic panel; Future    Prediabetes  -     Hemoglobin A1C; Future    Morbid obesity (HCC)    BMI 40 0-44 9, [facility] ([facility] )              Return in about 6 months (around 9/30/2022)  Subjective:      Patient ID: Phyllis Lee is a 43 y o  male  Chief Complaint   Patient presents with    Physical Exam     jlopezcma        HPI  Gained 45# in past 7m  Off his diet, was keto  No exercise program  Stress  Late meals  Diet soda  gerd stable  egd in past    The following portions of the patient's history were reviewed and updated as appropriate: allergies, current medications, past family history, past medical history, past social history, past surgical history and problem list     Review of Systems   Constitutional: Negative for chills and fever  Respiratory: Negative for shortness of breath            Current Outpatient Medications   Medication Sig Dispense Refill    losartan (COZAAR) 50 mg tablet Take 1 tablet (50 mg total) by mouth daily 90 tablet 1    omeprazole (PriLOSEC) 40 MG capsule Take 1 capsule (40 mg total) by mouth daily at bedtime as needed (gerd) 90 capsule 1     No current facility-administered medications for this visit  Objective:    /78   Pulse 88   Temp (!) 96 1 °F (35 6 °C)   Resp 16   Ht 6' (1 829 m)   Wt (!) 147 kg (323 lb)   SpO2 97%   BMI 43 81 kg/m²        Physical Exam  Vitals and nursing note reviewed  Constitutional:       Appearance: He is well-developed  He is obese  He is not ill-appearing  HENT:      Head: Normocephalic  Right Ear: Tympanic membrane normal       Left Ear: Tympanic membrane normal       Nose: No congestion  Mouth/Throat:      Pharynx: No oropharyngeal exudate  Eyes:      General: No scleral icterus  Conjunctiva/sclera: Conjunctivae normal    Cardiovascular:      Rate and Rhythm: Normal rate and regular rhythm  Heart sounds: No murmur heard  Pulmonary:      Effort: Pulmonary effort is normal  No respiratory distress  Breath sounds: No wheezing or rales  Abdominal:      General: There is no distension  Palpations: Abdomen is soft  Tenderness: There is no abdominal tenderness  Hernia: No hernia is present  Genitourinary:     Penis: Normal        Testes: Normal    Musculoskeletal:         General: No deformity  Cervical back: Neck supple  Skin:     General: Skin is warm and dry  Coloration: Skin is not jaundiced or pale  Neurological:      Mental Status: He is alert  Motor: No weakness  Gait: Gait normal    Psychiatric:         Mood and Affect: Mood normal          Behavior: Behavior normal          Thought Content:  Thought content normal                 Shivani Quijano DO

## 2022-04-01 ENCOUNTER — APPOINTMENT (OUTPATIENT)
Dept: LAB | Facility: CLINIC | Age: 37
End: 2022-04-01
Payer: COMMERCIAL

## 2022-04-01 DIAGNOSIS — Z13.6 SCREENING FOR CARDIOVASCULAR, RESPIRATORY, AND GENITOURINARY DISEASES: ICD-10-CM

## 2022-04-01 DIAGNOSIS — Z13.83 SCREENING FOR CARDIOVASCULAR, RESPIRATORY, AND GENITOURINARY DISEASES: ICD-10-CM

## 2022-04-01 DIAGNOSIS — I10 ESSENTIAL HYPERTENSION: ICD-10-CM

## 2022-04-01 DIAGNOSIS — Z13.89 SCREENING FOR CARDIOVASCULAR, RESPIRATORY, AND GENITOURINARY DISEASES: ICD-10-CM

## 2022-04-01 DIAGNOSIS — R73.03 PREDIABETES: ICD-10-CM

## 2022-04-01 DIAGNOSIS — Z13.1 SCREENING FOR DIABETES MELLITUS (DM): ICD-10-CM

## 2022-04-01 LAB
ALBUMIN SERPL BCP-MCNC: 4.1 G/DL (ref 3.5–5)
ALP SERPL-CCNC: 136 U/L (ref 46–116)
ALT SERPL W P-5'-P-CCNC: 44 U/L (ref 12–78)
ANION GAP SERPL CALCULATED.3IONS-SCNC: 4 MMOL/L (ref 4–13)
AST SERPL W P-5'-P-CCNC: 14 U/L (ref 5–45)
BILIRUB SERPL-MCNC: 0.5 MG/DL (ref 0.2–1)
BUN SERPL-MCNC: 19 MG/DL (ref 5–25)
CALCIUM SERPL-MCNC: 9.4 MG/DL (ref 8.3–10.1)
CHLORIDE SERPL-SCNC: 107 MMOL/L (ref 100–108)
CHOLEST SERPL-MCNC: 243 MG/DL
CO2 SERPL-SCNC: 26 MMOL/L (ref 21–32)
CREAT SERPL-MCNC: 1.04 MG/DL (ref 0.6–1.3)
EST. AVERAGE GLUCOSE BLD GHB EST-MCNC: 97 MG/DL
GFR SERPL CREATININE-BSD FRML MDRD: 90 ML/MIN/1.73SQ M
GLUCOSE P FAST SERPL-MCNC: 98 MG/DL (ref 65–99)
HBA1C MFR BLD: 5 %
HDLC SERPL-MCNC: 56 MG/DL
LDLC SERPL CALC-MCNC: 165 MG/DL (ref 0–100)
POTASSIUM SERPL-SCNC: 4 MMOL/L (ref 3.5–5.3)
PROT SERPL-MCNC: 8 G/DL (ref 6.4–8.2)
SODIUM SERPL-SCNC: 137 MMOL/L (ref 136–145)
TRIGL SERPL-MCNC: 110 MG/DL

## 2022-04-01 PROCEDURE — 80053 COMPREHEN METABOLIC PANEL: CPT

## 2022-04-01 PROCEDURE — 80061 LIPID PANEL: CPT

## 2022-04-01 PROCEDURE — 36415 COLL VENOUS BLD VENIPUNCTURE: CPT

## 2022-04-01 PROCEDURE — 83036 HEMOGLOBIN GLYCOSYLATED A1C: CPT

## 2022-04-26 ENCOUNTER — OFFICE VISIT (OUTPATIENT)
Dept: FAMILY MEDICINE CLINIC | Facility: CLINIC | Age: 37
End: 2022-04-26
Payer: COMMERCIAL

## 2022-04-26 VITALS
TEMPERATURE: 97.6 F | DIASTOLIC BLOOD PRESSURE: 80 MMHG | BODY MASS INDEX: 42.66 KG/M2 | HEART RATE: 105 BPM | SYSTOLIC BLOOD PRESSURE: 120 MMHG | RESPIRATION RATE: 16 BRPM | WEIGHT: 315 LBS | HEIGHT: 72 IN | OXYGEN SATURATION: 97 %

## 2022-04-26 DIAGNOSIS — J06.9 ACUTE UPPER RESPIRATORY INFECTION: Primary | ICD-10-CM

## 2022-04-26 PROCEDURE — 99213 OFFICE O/P EST LOW 20 MIN: CPT | Performed by: NURSE PRACTITIONER

## 2022-04-26 RX ORDER — BENZONATATE 200 MG/1
200 CAPSULE ORAL 3 TIMES DAILY PRN
Qty: 20 CAPSULE | Refills: 0 | Status: SHIPPED | OUTPATIENT
Start: 2022-04-26

## 2022-04-26 RX ORDER — ALBUTEROL SULFATE 90 UG/1
2 AEROSOL, METERED RESPIRATORY (INHALATION) EVERY 4 HOURS PRN
Qty: 8.5 G | Refills: 0 | Status: SHIPPED | OUTPATIENT
Start: 2022-04-26

## 2022-04-26 RX ORDER — AZITHROMYCIN 250 MG/1
TABLET, FILM COATED ORAL
Qty: 6 TABLET | Refills: 0 | Status: SHIPPED | OUTPATIENT
Start: 2022-04-26 | End: 2022-05-01

## 2022-04-26 NOTE — PROGRESS NOTES
Assessment/Plan:    Start tessalon and Albuterol prn now  To start antibiotics if no improvement toward the end of the week  Continue with symptomatic management    1  Acute upper respiratory infection  -     benzonatate (TESSALON) 200 MG capsule; Take 1 capsule (200 mg total) by mouth 3 (three) times a day as needed for cough  -     albuterol (ProAir HFA) 90 mcg/act inhaler; Inhale 2 puffs every 4 (four) hours as needed for shortness of breath  -     azithromycin (ZITHROMAX) 250 mg tablet; 2 tabs PO day 1, then 1 tab PO days 2-5          There are no Patient Instructions on file for this visit  Return if symptoms worsen or fail to improve  Subjective:      Patient ID: Clint Drew is a 43 y o  male  Chief Complaint   Patient presents with    Cough      cough started yesturday-wmcma    Cold Like Symptoms       Here today with complaints of a cough and some SOB with exertion  He has had some allergy symptoms for a few days prior with rhinorrhea and congestion  Denies any fevers, chills, and body aches  2 doses of Covid vaccine, no booster  Wife had Covid in January,  tt had symptoms and was presumptive positive, but did not test    Taking antihistamine and Keyana Weimar OTC  He is traveling to [facility]        The following portions of the patient's history were reviewed and updated as appropriate: allergies, current medications, past family history, past medical history, past social history, past surgical history and problem list     Review of Systems   Constitutional: Negative for chills, fatigue and fever  HENT: Positive for congestion, rhinorrhea and sore throat  Negative for ear pain, postnasal drip and sinus pressure  Respiratory: Positive for cough and shortness of breath  Negative for wheezing  Cardiovascular: Negative for chest pain  Gastrointestinal: Negative for abdominal pain, diarrhea, nausea and vomiting  Musculoskeletal: Negative for arthralgias     Skin: Negative for rash  Neurological: Negative for headaches  Current Outpatient Medications   Medication Sig Dispense Refill    losartan (COZAAR) 50 mg tablet Take 1 tablet (50 mg total) by mouth daily 90 tablet 1    omeprazole (PriLOSEC) 40 MG capsule Take 1 capsule (40 mg total) by mouth daily at bedtime as needed (gerd) 90 capsule 1    albuterol (ProAir HFA) 90 mcg/act inhaler Inhale 2 puffs every 4 (four) hours as needed for shortness of breath 8 5 g 0    azithromycin (ZITHROMAX) 250 mg tablet 2 tabs PO day 1, then 1 tab PO days 2-5 6 tablet 0    benzonatate (TESSALON) 200 MG capsule Take 1 capsule (200 mg total) by mouth 3 (three) times a day as needed for cough 20 capsule 0     No current facility-administered medications for this visit  Objective:    /80   Pulse 105   Temp 97 6 °F (36 4 °C)   Resp 16   Ht 6' (1 829 m)   Wt (!) 148 kg (327 lb)   SpO2 97%   BMI 44 35 kg/m²        Physical Exam  Vitals and nursing note reviewed  Constitutional:       Appearance: Normal appearance  He is well-developed  HENT:      Right Ear: Tympanic membrane normal       Left Ear: Tympanic membrane normal       Nose: Congestion present  Mouth/Throat:      Pharynx: No oropharyngeal exudate or posterior oropharyngeal erythema  Comments: Post nasal drip    Eyes:      Conjunctiva/sclera: Conjunctivae normal    Cardiovascular:      Rate and Rhythm: Normal rate and regular rhythm  Pulses: Normal pulses  Heart sounds: Normal heart sounds  No murmur heard  Pulmonary:      Effort: Pulmonary effort is normal       Breath sounds: Normal breath sounds  Lymphadenopathy:      Cervical: No cervical adenopathy  Skin:     General: Skin is warm and dry  Neurological:      Mental Status: He is alert     Psychiatric:         Mood and Affect: Mood normal          Behavior: Behavior normal                 ANASTASIA Ashraf

## 2022-11-23 DIAGNOSIS — I10 ESSENTIAL HYPERTENSION: ICD-10-CM

## 2022-11-23 DIAGNOSIS — K21.9 GASTROESOPHAGEAL REFLUX DISEASE: ICD-10-CM

## 2022-11-23 RX ORDER — OMEPRAZOLE 40 MG/1
40 CAPSULE, DELAYED RELEASE ORAL
Qty: 90 CAPSULE | Refills: 1 | Status: SHIPPED | OUTPATIENT
Start: 2022-11-23

## 2022-11-23 RX ORDER — LOSARTAN POTASSIUM 50 MG/1
50 TABLET ORAL DAILY
Qty: 90 TABLET | Refills: 0 | Status: SHIPPED | OUTPATIENT
Start: 2022-11-23

## 2023-01-08 PROBLEM — R73.03 PREDIABETES: Status: RESOLVED | Noted: 2022-03-31 | Resolved: 2023-01-08

## 2023-01-10 ENCOUNTER — OFFICE VISIT (OUTPATIENT)
Dept: FAMILY MEDICINE CLINIC | Facility: CLINIC | Age: 38
End: 2023-01-10

## 2023-01-10 VITALS
TEMPERATURE: 97.9 F | OXYGEN SATURATION: 98 % | WEIGHT: 315 LBS | BODY MASS INDEX: 42.66 KG/M2 | SYSTOLIC BLOOD PRESSURE: 118 MMHG | DIASTOLIC BLOOD PRESSURE: 78 MMHG | HEIGHT: 72 IN | HEART RATE: 83 BPM | RESPIRATION RATE: 16 BRPM

## 2023-01-10 DIAGNOSIS — I10 ESSENTIAL HYPERTENSION: Primary | ICD-10-CM

## 2023-01-10 DIAGNOSIS — E66.01 MORBID OBESITY (HCC): ICD-10-CM

## 2023-01-10 DIAGNOSIS — K21.9 CHRONIC GERD: ICD-10-CM

## 2023-01-10 RX ORDER — LOSARTAN POTASSIUM 50 MG/1
50 TABLET ORAL DAILY
Qty: 90 TABLET | Refills: 1 | Status: SHIPPED | OUTPATIENT
Start: 2023-01-10

## 2023-01-11 NOTE — PROGRESS NOTES
Assessment/Plan:    No problem-specific Assessment & Plan notes found for this encounter     htn stable on meds, labs due now and q6m    gerd stable, using ppi prn  Recent data suggesting increased risk of ischemic CVA and chronic kidney damage with PPI use was advised  bmi aware, he plans to work on it     Diagnoses and all orders for this visit:    Essential hypertension  -     losartan (COZAAR) 50 mg tablet; Take 1 tablet (50 mg total) by mouth daily  -     Comprehensive metabolic panel; Future  -     Comprehensive metabolic panel; Future    Chronic GERD    Morbid obesity ([facility] )    BMI 40 0-44 9, adult ([facility] )        Return in about 6 months (around 6/28/2023) for Annual physical     Subjective:      Patient ID: Kelly Hylton is a 42 y o  male  Chief Complaint   Patient presents with   • Follow-up     Meds  Kiana Swanson MA        HPI  bp good  New job, sitting job mostly  Diet is whatever, portions up/down  Going back to old job soon, closer to home    Drinks water    The following portions of the patient's history were reviewed and updated as appropriate: allergies, current medications, past family history, past medical history, past social history, past surgical history and problem list     Review of Systems   Constitutional: Positive for unexpected weight change  Negative for fever  Current Outpatient Medications   Medication Sig Dispense Refill   • losartan (COZAAR) 50 mg tablet Take 1 tablet (50 mg total) by mouth daily 90 tablet 1   • omeprazole (PriLOSEC) 40 MG capsule Take 1 capsule (40 mg total) by mouth daily at bedtime as needed (gerd) 90 capsule 1     No current facility-administered medications for this visit  Objective:    /78   Pulse 83   Temp 97 9 °F (36 6 °C)   Resp 16   Ht 6' (1 829 m)   Wt (!) 151 kg (333 lb)   SpO2 98%   BMI 45 16 kg/m²        Physical Exam  Vitals and nursing note reviewed  Constitutional:       General: He is not in acute distress  Appearance: He is well-developed  He is not ill-appearing  HENT:      Head: Normocephalic  Right Ear: Tympanic membrane normal       Left Ear: Tympanic membrane normal       Nose: No congestion  Eyes:      General: No scleral icterus  Conjunctiva/sclera: Conjunctivae normal    Cardiovascular:      Rate and Rhythm: Normal rate and regular rhythm  Heart sounds: No murmur heard  Pulmonary:      Effort: Pulmonary effort is normal  No respiratory distress  Breath sounds: No wheezing  Abdominal:      Palpations: Abdomen is soft  Musculoskeletal:         General: No deformity  Cervical back: Neck supple  Skin:     General: Skin is warm and dry  Coloration: Skin is not pale  Neurological:      Mental Status: He is alert  Motor: No weakness  Gait: Gait normal    Psychiatric:         Mood and Affect: Mood normal          Behavior: Behavior normal          Thought Content:  Thought content normal                 Nba Mattson,

## 2023-05-24 DIAGNOSIS — K21.9 GASTROESOPHAGEAL REFLUX DISEASE: ICD-10-CM

## 2023-05-24 DIAGNOSIS — I10 ESSENTIAL HYPERTENSION: ICD-10-CM

## 2023-05-24 RX ORDER — LOSARTAN POTASSIUM 50 MG/1
50 TABLET ORAL DAILY
Qty: 90 TABLET | Refills: 1 | Status: SHIPPED | OUTPATIENT
Start: 2023-05-24

## 2023-05-24 RX ORDER — OMEPRAZOLE 40 MG/1
40 CAPSULE, DELAYED RELEASE ORAL
Qty: 90 CAPSULE | Refills: 1 | Status: SHIPPED | OUTPATIENT
Start: 2023-05-24

## 2023-06-05 ENCOUNTER — APPOINTMENT (OUTPATIENT)
Dept: RADIOLOGY | Facility: CLINIC | Age: 38
End: 2023-06-05
Payer: COMMERCIAL

## 2023-06-05 ENCOUNTER — OFFICE VISIT (OUTPATIENT)
Dept: FAMILY MEDICINE CLINIC | Facility: CLINIC | Age: 38
End: 2023-06-05
Payer: COMMERCIAL

## 2023-06-05 VITALS
BODY MASS INDEX: 42.66 KG/M2 | HEIGHT: 72 IN | HEART RATE: 78 BPM | TEMPERATURE: 97.6 F | SYSTOLIC BLOOD PRESSURE: 130 MMHG | DIASTOLIC BLOOD PRESSURE: 80 MMHG | WEIGHT: 315 LBS | RESPIRATION RATE: 20 BRPM | OXYGEN SATURATION: 96 %

## 2023-06-05 DIAGNOSIS — M79.651 RIGHT THIGH PAIN: Primary | ICD-10-CM

## 2023-06-05 DIAGNOSIS — K21.9 CHRONIC GERD: ICD-10-CM

## 2023-06-05 DIAGNOSIS — M79.651 RIGHT THIGH PAIN: ICD-10-CM

## 2023-06-05 DIAGNOSIS — I10 ESSENTIAL HYPERTENSION: ICD-10-CM

## 2023-06-05 PROCEDURE — 99214 OFFICE O/P EST MOD 30 MIN: CPT | Performed by: NURSE PRACTITIONER

## 2023-06-05 PROCEDURE — 73552 X-RAY EXAM OF FEMUR 2/>: CPT

## 2023-06-05 RX ORDER — CYCLOBENZAPRINE HCL 10 MG
10 TABLET ORAL
Qty: 20 TABLET | Refills: 0 | Status: SHIPPED | OUTPATIENT
Start: 2023-06-05 | End: 2023-06-25

## 2023-06-05 RX ORDER — PREDNISONE 10 MG/1
TABLET ORAL
Qty: 20 TABLET | Refills: 0 | Status: SHIPPED | OUTPATIENT
Start: 2023-06-05 | End: 2023-06-15

## 2023-06-05 NOTE — PATIENT INSTRUCTIONS
Cyclobenzaprine (By mouth)   Cyclobenzaprine (teb-brik-YGR-za-preen)  Treats pain and stiffness caused by muscle spasms  Brand Name(s): Amrix, CycloTENS Refill Win, CycloTENS Starter Win, Fexmid, FusePaq [place], RapidPaq Tabradol   There may be other brand names for this medicine  When This Medicine Should Not Be Used: This medicine is not right for everyone  Do not use it if you had an allergic reaction to cyclobenzaprine  How to Use This Medicine:   Long Acting Capsule, Liquid, Tablet  Your doctor will tell you how much medicine to use  Do not use more than directed  Take this medicine at the same time each day  Swallow the extended-release capsule whole  Do not crush, break, or chew it  If you cannot swallow the capsule whole, you may open the capsule and sprinkle the contents over one tablespoon of applesauce  Swallow the mixture right away without chewing  Rinse the mouth to make sure all of the medicine have been swallowed  Do not save any of the mixture to use later  This medicine is not for long-term use  Missed dose: Take a dose as soon as you remember  If it is almost time for your next dose, wait until then and take a regular dose  Do not take extra medicine to make up for a missed dose  Store the medicine in a closed container at room temperature, away from heat, moisture, and direct light  Drugs and Foods to Avoid:   Ask your doctor or pharmacist before using any other medicine, including over-the-counter medicines, vitamins, and herbal products  Do not use this medicine if you have used an MAO inhibitor (MAOI) within 14 days of each other  Some foods and medicines can affect how this medicine works  Tell your doctor if you are using any of the following:   Bupropion, guanethidine, meperidine, tramadol, verapamil  Medicine to treat depression (including amitriptyline, imipramine)  Do not drink alcohol while you are using this medicine    Tell your doctor if you use anything else that makes you sleepy  Some examples are allergy medicine, narcotic pain medicine, and alcohol  Warnings While Using This Medicine:   Tell your doctor if you are pregnant or breastfeeding, or if you have liver problems, congestive heart failure, heart rhythm problems, a recent heart attack, overactive thyroid, or a history of glaucoma or trouble urinating  This medicine may cause the following problems:  Serotonin syndrome, when taken with certain medicines  This medicine may make you dizzy or drowsy  Do not drive or doing anything that could be dangerous until you know how this medicine affects you  Call your doctor if your symptoms do not improve or if they get worse  Keep all medicine out of the reach of children  Never share your medicine with anyone  Possible Side Effects While Using This Medicine:   Call your doctor right away if you notice any of these side effects: Allergic reaction: Itching or hives, swelling in your face or hands, swelling or tingling in your mouth or throat, chest tightness, trouble breathing  Anxiety, restlessness, fever, sweating, twitching, nausea, vomiting, diarrhea, seeing or hearing things that are not there  Fast, pounding, or uneven heartbeat  Severe drowsiness, fainting, or confusion  If you notice these less serious side effects, talk with your doctor:   Dizziness  Dry mouth  If you notice other side effects that you think are caused by this medicine, tell your doctor  Call your doctor for medical advice about side effects  You may report side effects to FDA at 3-066-FDA-0555  © Copyright [employer] 2022 Information is for End User's use only and may not be sold, redistributed or otherwise used for commercial purposes  The above information is an  only  It is not intended as medical advice for individual conditions or treatments  Talk to your doctor, nurse or pharmacist before following any medical regimen to see if it is safe and effective for you    Prednisone (By mouth)   Prednisone (PRED-ni-sone)  Treats many diseases and conditions, especially problems related to inflammation  This medicine is a corticosteroid  Brand Name(s): Minerva, predniSONE Intensol   There may be other brand names for this medicine  When This Medicine Should Not Be Used: This medicine is not right for everyone  Do not use if you had an allergic reaction to prednisone or if you are pregnant  How to Use This Medicine:   Liquid, Tablet, Delayed Release Tablet  Take your medicine as directed  Your dose may need to be changed several times to find what works best for you  It is best to take this medicine with food or milk  Swallow the delayed-release tablet whole  Do not crush, break, or chew it  Measure the oral liquid medicine with a marked measuring spoon, oral syringe, or medicine cup  Missed dose: Take a dose as soon as you remember  If it is almost time for your next dose, wait until then and take a regular dose  Do not take extra medicine to make up for a missed dose  Store the medicine in a closed container at room temperature, away from heat, moisture, and direct light  Do not freeze the oral liquid  Drugs and Foods to Avoid:   Ask your doctor or pharmacist before using any other medicine, including over-the-counter medicines, vitamins, and herbal products  Tell your doctor if you use any of the following:  Aminoglutethimide, amphotericin B, carbamazepine, cholestyramine, cyclosporine, digoxin, isoniazid, ketoconazole, phenobarbital, phenytoin, or rifampin  Blood thinner, such as warfarin  NSAID pain or arthritis medicine, such as aspirin, diclofenac, ibuprofen, naproxen, celecoxib  Diuretic (water pill)  Diabetes medicine  Macrolide antibiotic, such as azithromycin, clarithromycin, erythromycin  Estrogen, including birth control pills or hormone replacement therapy  This medicine may interfere with vaccines   Ask your doctor before you get a flu shot or any other vaccines  Warnings While Using This Medicine: It is not safe to take this medicine during pregnancy  It could harm an unborn baby  Tell your doctor right away if you become pregnant  Tell your doctor if you are breastfeeding or if you have kidney problems, heart failure, high blood pressure, a recent heart attack, diabetes, glaucoma, osteoporosis, or thyroid problems  Tell your doctor about any infection you have  Also tell your doctor if you have had mental or emotional problems (such as depression) or stomach or bowel problems (such as an ulcer or diverticulitis)  This medicine may cause the following problems:  Mood or behavior changes  Higher blood pressure, retaining water, changes in salt or potassium levels in your body  Cataracts or glaucoma (with long-term use)  Weak bones or osteoporosis (with long-term use)  Slow growth in children (with long-term use)  Muscle problems (with high doses, especially if you have myasthenia gravis or similar nerve and muscle problems)  Do not stop using this medicine suddenly  Your doctor will need to slowly decrease your dose before you stop it completely  This medicine could cause you to get infections more easily  Tell your doctor right away if you are exposed to chicken pox, measles, or other serious infection  Tell your doctor if you had a serious infection in the past, such as tuberculosis or herpes  Tell your doctor about any extra stress or anxiety in your life  Your dose might need to be changed for a short time  Tell any doctor or dentist who treats you that you are using this medicine  This medicine may affect certain medical test results  Keep all medicine out of the reach of children  Never share your medicine with anyone  Possible Side Effects While Using This Medicine:   Call your doctor right away if you notice any of these side effects:   Allergic reaction: Itching or hives, swelling in your face or hands, swelling or tingling in your mouth or throat, chest tightness, trouble breathing  Dark freckles, skin color changes, coldness, weakness, tiredness, nausea, vomiting, weight loss  Depression, unusual thoughts, feelings, or behaviors, trouble sleeping  Fever, chills, cough, sore throat, and body aches  Muscle pain or weakness  Rapid weight gain, swelling in your hands, ankles, or feet  Severe stomach pain, nausea, vomiting, or red or black stools  Skin changes or growths  Trouble seeing, eye pain, headache  If you notice these less serious side effects, talk with your doctor: Increased appetite  Round, puffy face  Weight gain around your neck, upper back, breast, face, or waist  If you notice other side effects that you think are caused by this medicine, tell your doctor  Call your doctor for medical advice about side effects  You may report side effects to FDA at 4-544-FDA-1133  © Copyright [employer] 2022 Information is for End User's use only and may not be sold, redistributed or otherwise used for commercial purposes  The above information is an  only  It is not intended as medical advice for individual conditions or treatments  Talk to your doctor, nurse or pharmacist before following any medical regimen to see if it is safe and effective for you

## 2023-06-05 NOTE — PROGRESS NOTES
Assessment/Plan:    1  Right thigh pain  -     predniSONE 10 mg tablet; 4 tabs x 2 days, 3 tabs x 2 days, 2 tabs x 2 days, 1 tab x 2 days  -     cyclobenzaprine (FLEXERIL) 10 mg tablet; Take 1 tablet (10 mg total) by mouth daily at bedtime for 20 days  -     XR femur 2 vw right; Future; Expected date: 06/05/2023    2  Chronic GERD  Assessment & Plan: On prilosec for that      3  Essential hypertension  Assessment & Plan:  Stable with current dose of losartan            Patient Instructions: Take prednisone with food in morning and do not take any NSAID's while taking prednisone  Do not drive and operate any machinery after taking muscle relaxant  Supportive care discussed and advised  Advised to RTO for any worsening and no improvement  Follow up for no improvement and worsening of conditions  Patient advised and educated when to see immediate medical care  Return if symptoms worsen or fail to improve  Future Appointments   Date Time Provider Son Reeves   7/10/2023  9:00 AM DO [facility]           Subjective:      Patient ID: Kelly Levi is a 42 y o  male  Chief Complaint   Patient presents with   • groin pain on occasion while walking      Started about a month   Shoulder and wrist pain has been a few months rmklpn         Vitals:  /80   Pulse 78   Temp 97 6 °F (36 4 °C)   Resp 20   Ht 6' (1 829 m)   Wt (!) 152 kg (334 lb)   SpO2 96%   BMI 45 30 kg/m²     HPI  Patient sated that having pain on right leg close to right groin area from month on and off and stated that long standing and walking aggravates it  Tried ibuprofen but no relief  Denies any bulge in right groin area  Stated that also having some shoulder movement pain and wrist pain at times but not having it today  Denies any family h/o autoimmune disease  Will follow back for reoccurrence of wrist and shoulder issues  Complaint with medications for chronic illnesses    Advised to loose weight  The following portions of the patient's history were reviewed and updated as appropriate: allergies, current medications, past family history, past medical history, past social history, past surgical history and problem list       Review of Systems   HENT: Negative  Respiratory: Negative  Cardiovascular: Negative  Musculoskeletal: Positive for arthralgias  As noted in HPI     Neurological: Negative  Objective:    Social History     Tobacco Use   Smoking Status Never   Smokeless Tobacco Never       Allergies: Allergies   Allergen Reactions   • Terbinafine Rash         Current Outpatient Medications   Medication Sig Dispense Refill   • cyclobenzaprine (FLEXERIL) 10 mg tablet Take 1 tablet (10 mg total) by mouth daily at bedtime for 20 days 20 tablet 0   • losartan (COZAAR) 50 mg tablet Take 1 tablet (50 mg total) by mouth daily 90 tablet 1   • omeprazole (PriLOSEC) 40 MG capsule Take 1 capsule (40 mg total) by mouth daily at bedtime as needed (gerd) 90 capsule 1   • predniSONE 10 mg tablet 4 tabs x 2 days, 3 tabs x 2 days, 2 tabs x 2 days, 1 tab x 2 days 20 tablet 0     No current facility-administered medications for this visit  Physical Exam  Exam conducted with a chaperone present (Francis Loya)  Constitutional:       Appearance: He is obese  HENT:      Head: Normocephalic  Nose: Nose normal    Eyes:      Conjunctiva/sclera: Conjunctivae normal    Cardiovascular:      Rate and Rhythm: Normal rate and regular rhythm  Heart sounds: Normal heart sounds  Pulmonary:      Effort: Pulmonary effort is normal       Breath sounds: Normal breath sounds  Genitourinary:     Penis: Circumcised  No tenderness or swelling  Testes:         Right: Mass or tenderness not present  Left: Mass or tenderness not present  Epididymis:      Right: No mass or tenderness  Left: No mass or tenderness     Musculoskeletal:         General: Tenderness present  No swelling  Normal range of motion  Legs:    Lymphadenopathy:      Lower Body: No right inguinal adenopathy  Skin:     General: Skin is warm and dry  Findings: No rash  Neurological:      Mental Status: He is alert and oriented to person, place, and time  Psychiatric:         Mood and Affect: Mood normal          Behavior: Behavior normal          Thought Content:  Thought content normal          Judgment: Judgment normal                      ANASTASIA Charles

## 2023-07-09 PROBLEM — Z91.89 FRAMINGHAM CARDIAC RISK <10% IN NEXT 10 YEARS: Status: RESOLVED | Noted: 2019-09-23 | Resolved: 2023-07-09

## 2023-07-09 PROBLEM — K21.9 GASTROESOPHAGEAL REFLUX DISEASE: Status: RESOLVED | Noted: 2022-03-31 | Resolved: 2023-07-09

## 2023-07-12 ENCOUNTER — OFFICE VISIT (OUTPATIENT)
Dept: FAMILY MEDICINE CLINIC | Facility: CLINIC | Age: 38
End: 2023-07-12
Payer: COMMERCIAL

## 2023-07-12 ENCOUNTER — TELEPHONE (OUTPATIENT)
Dept: FAMILY MEDICINE CLINIC | Facility: CLINIC | Age: 38
End: 2023-07-12

## 2023-07-12 VITALS
HEIGHT: 72 IN | DIASTOLIC BLOOD PRESSURE: 84 MMHG | SYSTOLIC BLOOD PRESSURE: 122 MMHG | WEIGHT: 315 LBS | TEMPERATURE: 96.8 F | RESPIRATION RATE: 19 BRPM | BODY MASS INDEX: 42.66 KG/M2 | HEART RATE: 69 BPM

## 2023-07-12 DIAGNOSIS — Z00.00 HEALTHCARE MAINTENANCE: Primary | ICD-10-CM

## 2023-07-12 DIAGNOSIS — R19.4 FREQUENT BOWEL MOVEMENTS: ICD-10-CM

## 2023-07-12 DIAGNOSIS — I10 ESSENTIAL HYPERTENSION: ICD-10-CM

## 2023-07-12 DIAGNOSIS — Z12.5 PROSTATE CANCER SCREENING: ICD-10-CM

## 2023-07-12 DIAGNOSIS — E66.01 MORBID OBESITY (HCC): ICD-10-CM

## 2023-07-12 DIAGNOSIS — G47.33 OSA (OBSTRUCTIVE SLEEP APNEA): ICD-10-CM

## 2023-07-12 PROCEDURE — 99395 PREV VISIT EST AGE 18-39: CPT | Performed by: FAMILY MEDICINE

## 2023-07-12 RX ORDER — HYOSCYAMINE SULFATE 0.125 MG
0.12 TABLET ORAL EVERY 6 HOURS PRN
Qty: 40 TABLET | Refills: 1 | Status: SHIPPED | OUTPATIENT
Start: 2023-07-12 | End: 2023-07-15

## 2023-07-12 NOTE — PROGRESS NOTES
Assessment/Plan:    No problem-specific Assessment & Plan notes found for this encounter. cpe    ibs d hx  Anxiety trigger  Trial of levsin  1-2 q6hr prn  Suggest gi re-eval if no better    Offer counseling for primary anxiety when ready    LENORE  Consider inspire    htn stable on meds    Suggest PPI prn to reduce PPI risk     Diagnoses and all orders for this visit:    Healthcare maintenance    LENORE (obstructive sleep apnea)    Morbid obesity ([address])    Essential hypertension  -     Comprehensive metabolic panel; Future    Frequent bowel movements  -     hyoscyamine (ANASPAZ,LEVSIN) 0.125 MG tablet; Take 1 tablet (0.125 mg total) by mouth every 6 (six) hours as needed for cramping    Prostate cancer screening  -     PSA, ultrasensitive; Future        Return in about 27 weeks (around 1/17/2024) for Recheck. Subjective:      Patient ID: Logan Betancourt is a 42 y.o. male. Chief Complaint   Patient presents with   • Annual Exam     Nm.lpn       HPI  Taking his meds  Eating healthier  Not in gym  Yard work  More water, 64oz  Dinner varies  Lunch typically largest meal    gerd most days    Anxious per pt  Frequent bms when gets anxious  No blood in stool  Not diet related or triggered per pt  Questran in past by gi, unknown who  Colonoscopy normal in past    The following portions of the patient's history were reviewed and updated as appropriate: allergies, current medications, past family history, past medical history, past social history, past surgical history and problem list.    Review of Systems   Constitutional: Negative for chills and fever.          Current Outpatient Medications   Medication Sig Dispense Refill   • cyclobenzaprine (FLEXERIL) 10 mg tablet Take 1 tablet (10 mg total) by mouth daily at bedtime for 20 days 20 tablet 0   • hyoscyamine (ANASPAZ,LEVSIN) 0.125 MG tablet Take 1 tablet (0.125 mg total) by mouth every 6 (six) hours as needed for cramping 40 tablet 1   • losartan (COZAAR) 50 mg tablet Take 1 tablet (50 mg total) by mouth daily 90 tablet 1   • omeprazole (PriLOSEC) 40 MG capsule Take 1 capsule (40 mg total) by mouth daily at bedtime as needed (gerd) (Patient taking differently: Take 40 mg by mouth if needed (gerd)) 90 capsule 1     No current facility-administered medications for this visit. Objective:    /84   Pulse 69   Temp (!) 96.8 °F (36 °C)   Resp 19   Ht 6' (1.829 m)   Wt (!) 150 kg (331 lb)   BMI 44.89 kg/m²        Physical Exam  Vitals and nursing note reviewed. Constitutional:       General: He is not in acute distress. Appearance: He is well-developed. He is not ill-appearing. HENT:      Head: Normocephalic. Right Ear: Tympanic membrane normal.      Left Ear: Tympanic membrane normal.   Eyes:      General: No scleral icterus. Conjunctiva/sclera: Conjunctivae normal.   Cardiovascular:      Rate and Rhythm: Normal rate and regular rhythm. Heart sounds: No murmur heard. Pulmonary:      Effort: Pulmonary effort is normal. No respiratory distress. Abdominal:      General: There is no distension. Palpations: Abdomen is soft. Tenderness: There is no abdominal tenderness. Hernia: No hernia is present. Musculoskeletal:         General: No deformity. Cervical back: Neck supple. Right lower leg: No edema. Left lower leg: No edema. Skin:     General: Skin is warm and dry. Coloration: Skin is not pale. Neurological:      Mental Status: He is alert. Motor: No weakness. Gait: Gait normal.   Psychiatric:         Mood and Affect: Mood normal.         Behavior: Behavior normal.         Thought Content: Thought content normal.       BMI Counseling: Body mass index is 44.89 kg/m². The BMI is above normal. Nutrition recommendations include decreasing portion sizes and moderation in carbohydrate intake. Exercise recommendations include exercising 3-5 times per week. No pharmacotherapy was ordered.  Rationale for BMI follow-up plan is due to patient being overweight or obese. Depression Screening and Follow-up Plan: Patient was screened for depression during today's encounter. They screened negative with a PHQ-2 score of 0.              Isaiah Dobbs DO

## 2023-07-12 NOTE — TELEPHONE ENCOUNTER
Prior Auth submitted for hyoscyamine (ANASPAZ,LEVSIN) 0.125 MG tablet. Waiting for a response.     Key: [id number]  PA Case ID: [id number]  Rx#: [id number]    Tayler Roy LPN

## 2023-07-13 NOTE — TELEPHONE ENCOUNTER
Prior Auth was denied, it states "Denial Rationale:  The requested medication is excluded as a plan benefit."    Katalina Morrison, [place]MN

## 2023-07-14 ENCOUNTER — TRANSCRIBE ORDERS (OUTPATIENT)
Dept: LAB | Facility: CLINIC | Age: 38
End: 2023-07-14

## 2023-07-14 ENCOUNTER — APPOINTMENT (OUTPATIENT)
Dept: LAB | Facility: CLINIC | Age: 38
End: 2023-07-14
Payer: COMMERCIAL

## 2023-07-14 DIAGNOSIS — Z00.8 HEALTH EXAMINATION IN POPULATION SURVEY: Primary | ICD-10-CM

## 2023-07-14 PROCEDURE — 83036 HEMOGLOBIN GLYCOSYLATED A1C: CPT

## 2023-07-14 PROCEDURE — 80061 LIPID PANEL: CPT

## 2023-07-14 PROCEDURE — 36415 COLL VENOUS BLD VENIPUNCTURE: CPT

## 2023-07-15 DIAGNOSIS — K58.8 OTHER IRRITABLE BOWEL SYNDROME: Primary | ICD-10-CM

## 2023-07-15 LAB
CHOLEST SERPL-MCNC: 224 MG/DL
EST. AVERAGE GLUCOSE BLD GHB EST-MCNC: 91 MG/DL
HBA1C MFR BLD: 4.8 %
HDLC SERPL-MCNC: 40 MG/DL
LDLC SERPL CALC-MCNC: 159 MG/DL (ref 0–100)
NONHDLC SERPL-MCNC: 184 MG/DL
TRIGL SERPL-MCNC: 123 MG/DL

## 2023-07-15 RX ORDER — DICYCLOMINE HYDROCHLORIDE 10 MG/1
10 CAPSULE ORAL EVERY 6 HOURS PRN
Qty: 60 CAPSULE | Refills: 2 | Status: SHIPPED | OUTPATIENT
Start: 2023-07-15

## 2023-12-06 DIAGNOSIS — K21.9 GASTROESOPHAGEAL REFLUX DISEASE: ICD-10-CM

## 2023-12-06 DIAGNOSIS — I10 ESSENTIAL HYPERTENSION: ICD-10-CM

## 2023-12-07 RX ORDER — OMEPRAZOLE 40 MG/1
40 CAPSULE, DELAYED RELEASE ORAL
Qty: 90 CAPSULE | Refills: 1 | Status: SHIPPED | OUTPATIENT
Start: 2023-12-07

## 2023-12-07 RX ORDER — LOSARTAN POTASSIUM 50 MG/1
50 TABLET ORAL DAILY
Qty: 90 TABLET | Refills: 0 | Status: SHIPPED | OUTPATIENT
Start: 2023-12-07

## 2024-01-22 ENCOUNTER — APPOINTMENT (OUTPATIENT)
Dept: LAB | Facility: CLINIC | Age: 39
End: 2024-01-22
Payer: COMMERCIAL

## 2024-01-22 DIAGNOSIS — Z12.5 PROSTATE CANCER SCREENING: ICD-10-CM

## 2024-01-22 DIAGNOSIS — I10 ESSENTIAL HYPERTENSION: ICD-10-CM

## 2024-01-22 LAB
ALBUMIN SERPL BCP-MCNC: 4.4 G/DL (ref 3.5–5)
ALP SERPL-CCNC: 116 U/L (ref 34–104)
ALT SERPL W P-5'-P-CCNC: 27 U/L (ref 7–52)
ANION GAP SERPL CALCULATED.3IONS-SCNC: 7 MMOL/L
AST SERPL W P-5'-P-CCNC: 15 U/L (ref 13–39)
BILIRUB SERPL-MCNC: 0.36 MG/DL (ref 0.2–1)
BUN SERPL-MCNC: 21 MG/DL (ref 5–25)
CALCIUM SERPL-MCNC: 9.4 MG/DL (ref 8.4–10.2)
CHLORIDE SERPL-SCNC: 102 MMOL/L (ref 96–108)
CO2 SERPL-SCNC: 30 MMOL/L (ref 21–32)
CREAT SERPL-MCNC: 1.05 MG/DL (ref 0.6–1.3)
GFR SERPL CREATININE-BSD FRML MDRD: 88 ML/MIN/1.73SQ M
GLUCOSE P FAST SERPL-MCNC: 101 MG/DL (ref 65–99)
POTASSIUM SERPL-SCNC: 4.7 MMOL/L (ref 3.5–5.3)
PROT SERPL-MCNC: 7.6 G/DL (ref 6.4–8.4)
SODIUM SERPL-SCNC: 139 MMOL/L (ref 135–147)

## 2024-01-22 PROCEDURE — 84153 ASSAY OF PSA TOTAL: CPT

## 2024-01-22 PROCEDURE — 36415 COLL VENOUS BLD VENIPUNCTURE: CPT

## 2024-01-22 PROCEDURE — 80053 COMPREHEN METABOLIC PANEL: CPT

## 2024-01-23 ENCOUNTER — OFFICE VISIT (OUTPATIENT)
Dept: FAMILY MEDICINE CLINIC | Facility: CLINIC | Age: 39
End: 2024-01-23
Payer: COMMERCIAL

## 2024-01-23 VITALS
RESPIRATION RATE: 16 BRPM | TEMPERATURE: 97.7 F | OXYGEN SATURATION: 97 % | BODY MASS INDEX: 42.66 KG/M2 | WEIGHT: 315 LBS | SYSTOLIC BLOOD PRESSURE: 118 MMHG | DIASTOLIC BLOOD PRESSURE: 80 MMHG | HEART RATE: 100 BPM | HEIGHT: 72 IN

## 2024-01-23 DIAGNOSIS — Z13.6 SCREENING FOR CARDIOVASCULAR, RESPIRATORY, AND GENITOURINARY DISEASES: ICD-10-CM

## 2024-01-23 DIAGNOSIS — K21.9 CHRONIC GERD: ICD-10-CM

## 2024-01-23 DIAGNOSIS — I10 ESSENTIAL HYPERTENSION: ICD-10-CM

## 2024-01-23 DIAGNOSIS — Z13.89 SCREENING FOR CARDIOVASCULAR, RESPIRATORY, AND GENITOURINARY DISEASES: ICD-10-CM

## 2024-01-23 DIAGNOSIS — E66.01 MORBID OBESITY (HCC): ICD-10-CM

## 2024-01-23 DIAGNOSIS — R73.03 PREDIABETES: Primary | ICD-10-CM

## 2024-01-23 DIAGNOSIS — Z13.83 SCREENING FOR CARDIOVASCULAR, RESPIRATORY, AND GENITOURINARY DISEASES: ICD-10-CM

## 2024-01-23 LAB — PSA SERPL DL<=0.01 NG/ML-MCNC: 0.22 NG/ML (ref 0–4)

## 2024-01-23 PROCEDURE — 99214 OFFICE O/P EST MOD 30 MIN: CPT | Performed by: FAMILY MEDICINE

## 2024-01-23 NOTE — PROGRESS NOTES
Assessment/Plan:    No problem-specific Assessment & Plan notes found for this encounter.    Prediabetes with fbs 101  Encourage diet, low carbs, wt loss    Htn stable  Cont meds    Gerd stable on ppi    Continue diet/exercise efforts  Could discuss wegovy in future  Should try to check coverage and availability first     Diagnoses and all orders for this visit:    Prediabetes  -     Comprehensive metabolic panel; Future  -     Hemoglobin A1C; Future    Screening for cardiovascular, respiratory, and genitourinary diseases  -     Lipid Panel with Direct LDL reflex; Future    BMI 40.0-44.9, adult (HCC)    Morbid obesity (HCC)    Chronic GERD    Essential hypertension              Return in about 6 months (around 7/23/2024) for Annual physical.    Subjective:      Patient ID: Jose Granados is a 38 y.o. male.    Chief Complaint   Patient presents with    Follow-up     Labs  Sanna Kat MA        HPI  Taking his meds  Salt in cooking  Not at table  Cut soda for 3w  More water lately  Not been exercising  Fbs 101    The following portions of the patient's history were reviewed and updated as appropriate: allergies, current medications, past family history, past medical history, past social history, past surgical history and problem list.    Review of Systems   Constitutional:  Negative for fever and unexpected weight change.         Current Outpatient Medications   Medication Sig Dispense Refill    losartan (COZAAR) 50 mg tablet Take 1 tablet (50 mg total) by mouth daily 90 tablet 0    omeprazole (PriLOSEC) 40 MG capsule Take 1 capsule (40 mg total) by mouth daily at bedtime as needed (gerd) 90 capsule 1     No current facility-administered medications for this visit.       Objective:    /80   Pulse 100   Temp 97.7 °F (36.5 °C)   Resp 16   Ht 6' (1.829 m)   Wt (!) 152 kg (336 lb)   SpO2 97%   BMI 45.57 kg/m²        Physical Exam  Vitals and nursing note reviewed.   Constitutional:       General: He is  not in acute distress.     Appearance: He is well-developed. He is obese. He is not ill-appearing.   HENT:      Head: Normocephalic.      Right Ear: Tympanic membrane normal.      Left Ear: Tympanic membrane normal.   Eyes:      General: No scleral icterus.     Conjunctiva/sclera: Conjunctivae normal.   Cardiovascular:      Rate and Rhythm: Normal rate and regular rhythm.      Heart sounds: No murmur heard.  Pulmonary:      Effort: Pulmonary effort is normal. No respiratory distress.      Breath sounds: No wheezing.   Abdominal:      Palpations: Abdomen is soft.   Musculoskeletal:         General: No deformity.      Cervical back: Neck supple.      Right lower leg: No edema.      Left lower leg: No edema.   Skin:     General: Skin is warm and dry.      Coloration: Skin is not pale.   Neurological:      Mental Status: He is alert.      Motor: No weakness.      Gait: Gait normal.   Psychiatric:         Mood and Affect: Mood normal.         Behavior: Behavior normal.         Thought Content: Thought content normal.                Bobby Berumen DO

## 2024-02-21 PROBLEM — Z13.6 SCREENING FOR CARDIOVASCULAR, RESPIRATORY, AND GENITOURINARY DISEASES: Status: RESOLVED | Noted: 2018-03-16 | Resolved: 2024-02-21

## 2024-02-21 PROBLEM — Z13.89 SCREENING FOR CARDIOVASCULAR, RESPIRATORY, AND GENITOURINARY DISEASES: Status: RESOLVED | Noted: 2018-03-16 | Resolved: 2024-02-21

## 2024-02-21 PROBLEM — Z13.83 SCREENING FOR CARDIOVASCULAR, RESPIRATORY, AND GENITOURINARY DISEASES: Status: RESOLVED | Noted: 2018-03-16 | Resolved: 2024-02-21

## 2024-02-21 PROBLEM — Z13.1 SCREENING FOR DIABETES MELLITUS (DM): Status: RESOLVED | Noted: 2018-03-16 | Resolved: 2024-02-21

## 2024-02-21 PROBLEM — Z00.00 HEALTHCARE MAINTENANCE: Status: RESOLVED | Noted: 2019-09-16 | Resolved: 2024-02-21

## 2024-03-12 DIAGNOSIS — I10 ESSENTIAL HYPERTENSION: ICD-10-CM

## 2024-03-12 RX ORDER — LOSARTAN POTASSIUM 50 MG/1
50 TABLET ORAL DAILY
Qty: 90 TABLET | Refills: 1 | Status: SHIPPED | OUTPATIENT
Start: 2024-03-12

## 2024-07-12 ENCOUNTER — HOSPITAL ENCOUNTER (EMERGENCY)
Facility: HOSPITAL | Age: 39
Discharge: HOME | End: 2024-07-12
Attending: EMERGENCY MEDICINE
Payer: OTHER MISCELLANEOUS

## 2024-07-12 VITALS
OXYGEN SATURATION: 96 % | RESPIRATION RATE: 20 BRPM | HEART RATE: 68 BPM | DIASTOLIC BLOOD PRESSURE: 97 MMHG | TEMPERATURE: 98.3 F | WEIGHT: 307 LBS | SYSTOLIC BLOOD PRESSURE: 165 MMHG | BODY MASS INDEX: 41.64 KG/M2
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
## 2024-07-12 DIAGNOSIS — S01.85XA: ICD-10-CM

## 2024-07-12 DIAGNOSIS — S01.85XA OPEN WOUND OF FACE DUE TO DOG BITE: Primary | ICD-10-CM
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
## 2024-07-12 PROCEDURE — 90675 RABIES VACCINE IM: CPT | Performed by: EMERGENCY MEDICINE

## 2024-07-12 PROCEDURE — 90715 TDAP VACCINE 7 YRS/> IM: CPT | Performed by: EMERGENCY MEDICINE

## 2024-07-12 PROCEDURE — 96372 THER/PROPH/DIAG INJ SC/IM: CPT

## 2024-07-12 PROCEDURE — 90472 IMMUNIZATION ADMIN EACH ADD: CPT

## 2024-07-12 PROCEDURE — 99284 EMERGENCY DEPT VISIT MOD MDM: CPT | Performed by: EMERGENCY MEDICINE

## 2024-07-12 PROCEDURE — 99282 EMERGENCY DEPT VISIT SF MDM: CPT

## 2024-07-12 PROCEDURE — 90471 IMMUNIZATION ADMIN: CPT

## 2024-07-12 PROCEDURE — 90376 RABIES IG HEAT TREATED: CPT | Performed by: EMERGENCY MEDICINE

## 2024-07-12 RX ORDER — AMOXICILLIN AND CLAVULANATE POTASSIUM 875; 125 MG/1; MG/1
1 TABLET, FILM COATED ORAL ONCE
Status: COMPLETED | OUTPATIENT
Start: 2024-07-12 | End: 2024-07-12

## 2024-07-12 RX ORDER — GINSENG 100 MG
1 CAPSULE ORAL ONCE
Status: COMPLETED | OUTPATIENT
Start: 2024-07-12 | End: 2024-07-12

## 2024-07-12 RX ORDER — AMOXICILLIN AND CLAVULANATE POTASSIUM 875; 125 MG/1; MG/1
1 TABLET, FILM COATED ORAL EVERY 12 HOURS
Qty: 14 TABLET | Refills: 0 | Status: SHIPPED | OUTPATIENT
Start: 2024-07-12 | End: 2024-07-19

## 2024-07-12 RX ADMIN — TETANUS TOXOID, REDUCED DIPHTHERIA TOXOID AND ACELLULAR PERTUSSIS VACCINE, ADSORBED 0.5 ML: 5; 2.5; 8; 8; 2.5 SUSPENSION INTRAMUSCULAR at 14:19

## 2024-07-12 RX ADMIN — RABIES IMMUNE GLOBULIN (HUMAN) 2700 UNITS: 300 INJECTION, SOLUTION INFILTRATION; INTRAMUSCULAR at 14:19

## 2024-07-12 RX ADMIN — BACITRACIN 1 SMALL APPLICATION: 500 OINTMENT TOPICAL at 14:21

## 2024-07-12 RX ADMIN — AMOXICILLIN AND CLAVULANATE POTASSIUM 1 TABLET: 875; 125 TABLET, FILM COATED ORAL at 14:17

## 2024-07-12 RX ADMIN — RABIES VIRUS STRAIN PM-1503-3M ANTIGEN (PROPIOLACTONE INACTIVATED) AND WATER 1 ML: KIT at 14:18

## 2024-07-12 NOTE — DISCHARGE INSTRUCTIONS
Follow-up with primary care for further care. Contact info provided below if needed.  Use over the counter medications as stated on the bottle as needed for pain control.  Take your new medications as prescribed and to completion.  Keep wound clean.   Return on 7/15, 7/19, and 7/26 for remaining rabies vaccines.   Return to the ED with new or worsening symptoms.

## 2024-07-12 NOTE — Clinical Note
Jose Granados was seen and treated in our emergency department on 7/12/2024.                Diagnosis: Dog bite    Jose  may return to work on return date.    He may return on this date: 07/12/2024         If you have any questions or concerns, please don't hesitate to call.      Valorie Grey MD    ______________________________           _______________          _______________  Hospital Representative                              Date                                Time

## 2024-07-12 NOTE — ED PROVIDER NOTES
History  Chief Complaint   Patient presents with    Dog Bite     Dog bite to face while at work     Pt is a 37yo M who presents for dog bite.  Patient reports he was at work when he was bit by a dog on his face.  Patient reports upper lip and chin lacerations.  Patient states he was able to get the bleeding to stop without difficulty.  Patient denies any other injuries or complaints.  Patient states that he believes his last tetanus shot was in 2016.  Patient denies any regular aspirin use but states he did take aspirin this morning for pain.  Patient denies any blood thinner use.  Patient states that he was told the dog is up-to-date on vaccines but they were unable to present proof of vaccinations.        Prior to Admission Medications   Prescriptions Last Dose Informant Patient Reported? Taking?   losartan (COZAAR) 50 mg tablet   No No   Sig: Take 1 tablet (50 mg total) by mouth daily   omeprazole (PriLOSEC) 40 MG capsule   No No   Sig: Take 1 capsule (40 mg total) by mouth daily at bedtime as needed (gerd)      Facility-Administered Medications: None       Past Medical History:   Diagnosis Date    Acquired ankle/foot deformity     LAST ASSESSED 24OCT2014    Hidradenitis suppurativa     ONSET [id number]    Pes planus, congenital     LAST ASSESSED 24OCT2014    Shoulder dislocation     LAST ASSESSED 26MAR2014       Past Surgical History:   Procedure Laterality Date    BREAST SURGERY      KNEE SURGERY         Family History   Problem Relation Age of Onset    Depression Mother     Coronary artery disease Father         passed away age 29    Arthritis Family     Cancer Family         GASTRIC     Breast cancer Family     Osteoporosis Family      I have reviewed and agree with the history as documented.    E-Cigarette/Vaping    E-Cigarette Use Never User      E-Cigarette/Vaping Substances    Nicotine No     THC No     CBD No     Flavoring No     Other No     Unknown No      Social History     Tobacco Use    Smoking  status: Never    Smokeless tobacco: Never   Vaping Use    Vaping status: Never Used   Substance Use Topics    Alcohol use: Yes     Comment: social    Drug use: Never       Review of Systems   Skin:  Positive for wound.   All other systems reviewed and are negative.      Physical Exam  Physical Exam  Vitals reviewed.   Constitutional:       General: He is not in acute distress.     Appearance: He is well-developed. He is not toxic-appearing or diaphoretic.   HENT:      Head: Normocephalic.        Right Ear: External ear normal.      Left Ear: External ear normal.      Nose: Nose normal.   Eyes:      Conjunctiva/sclera: Conjunctivae normal.      Pupils: Pupils are equal, round, and reactive to light.   Cardiovascular:      Rate and Rhythm: Normal rate and regular rhythm.      Heart sounds: Normal heart sounds.   Pulmonary:      Effort: Pulmonary effort is normal. No respiratory distress.      Breath sounds: Normal breath sounds.   Abdominal:      General: Bowel sounds are normal. There is no distension.      Palpations: Abdomen is soft.      Tenderness: There is no abdominal tenderness.   Musculoskeletal:         General: Normal range of motion.      Cervical back: Neck supple.   Skin:     General: Skin is warm and dry.      Capillary Refill: Capillary refill takes less than 2 seconds.   Neurological:      Mental Status: He is alert and oriented to person, place, and time.   Psychiatric:         Speech: Speech normal.         Behavior: Behavior is cooperative.         Vital Signs  ED Triage Vitals [07/12/24 1308]   Temperature Pulse Respirations Blood Pressure SpO2   98.3 °F (36.8 °C) 68 (!) 24 165/97 96 %      Temp Source Heart Rate Source Patient Position - Orthostatic VS BP Location FiO2 (%)   Tympanic Monitor Sitting Right arm --      Pain Score       4           Vitals:    07/12/24 1308   BP: 165/97   Pulse: 68   Patient Position - Orthostatic VS: Sitting         Visual Acuity      ED Medications  Medications    bacitracin topical ointment 1 small application (1 small application Topical Given 7/12/24 1421)   amoxicillin-clavulanate (AUGMENTIN) 875-125 mg per tablet 1 tablet (1 tablet Oral Given 7/12/24 1417)   Rabies Immune Globulin (HyperRAB) injection 2,700 Units (2,700 Units Infiltration Given 7/12/24 1419)   rabies vaccine, human diploid IM injection 1 mL (1 mL Intramuscular Given 7/12/24 1418)   tetanus-diphtheria-acellular pertussis (BOOSTRIX) IM injection 0.5 mL (0.5 mL Intramuscular Given 7/12/24 1419)       Diagnostic Studies  Results Reviewed       None                   No orders to display              Procedures  Procedures         ED Course  ED Course as of 07/12/24 1722   Fri Jul 12, 2024   1343 Wounds cleaned with saline. Well approximated and with no active bleeding or FB noted. Discussed with pt recommendation to not close wound as already well approximated and infectious risk as bite wound. Pt agreeable. Discussed rabies prophylaxis due to location of wound and pt agreeable. Will also treat with prophylactic abx.                                                Medical Decision Making  Pt is a 39yo M who presents with dog bite.    See ED course for results and details.    Plan to discharge pt with f/u to PCP. Discussed returning the ED with new or worsening of symptoms. Discussed use of over the counter medications as stated on the bottle as needed for pain. Discussed taking new medication as prescribed and to completion.  Discussed returning for remainder of vaccine series.  Pt expressed understanding of discharge instructions, return precautions, and medication instructions and is stable for discharge at this time. All questions were answered and pt was discharged without incident.       Risk  OTC drugs.  Prescription drug management.                 Disposition  Final diagnoses:   Open wound of face due to dog bite   Open wound of chin due to dog bite     Time reflects when diagnosis was documented in  both MDM as applicable and the Disposition within this note       Time User Action Codes Description Comment    7/12/2024  1:41 PM Valorie Grey Add [S01.85XA,  W54.0XXA] Open wound of face due to dog bite     7/12/2024  1:42 PM Valorie Grey Add [S01.85XA,  W54.0XXA] Open wound of chin due to dog bite           ED Disposition       ED Disposition   Discharge    Condition   Stable    Date/Time   Fri Jul 12, 2024  2:32 PM    Comment   Jose Granados discharge to home/self care.                   Follow-up Information       Follow up With Specialties Details Why Contact Info    Bobby Berumen, DO Family Medicine Call  As needed [address] [address]  [phone]              Discharge Medication List as of 7/12/2024  1:47 PM        START taking these medications    Details   amoxicillin-clavulanate (AUGMENTIN) 875-125 mg per tablet Take 1 tablet by mouth every 12 (twelve) hours for 7 days, Starting Fri 7/12/2024, Until Fri 7/19/2024, Normal           CONTINUE these medications which have NOT CHANGED    Details   losartan (COZAAR) 50 mg tablet Take 1 tablet (50 mg total) by mouth daily, Starting Tue 3/12/2024, Normal      omeprazole (PriLOSEC) 40 MG capsule Take 1 capsule (40 mg total) by mouth daily at bedtime as needed (gerd), Starting Thu 12/7/2023, Normal             No discharge procedures on file.    PDMP Review       None            ED Provider  Electronically Signed by             Valorie Grey MD  07/12/24 2352

## 2024-07-15 ENCOUNTER — HOSPITAL ENCOUNTER (EMERGENCY)
Facility: HOSPITAL | Age: 39
Discharge: HOME | End: 2024-07-15
Attending: EMERGENCY MEDICINE | Admitting: EMERGENCY MEDICINE
Payer: OTHER MISCELLANEOUS

## 2024-07-15 VITALS
DIASTOLIC BLOOD PRESSURE: 95 MMHG | TEMPERATURE: 98.4 F | OXYGEN SATURATION: 96 % | HEART RATE: 63 BPM | SYSTOLIC BLOOD PRESSURE: 142 MMHG | RESPIRATION RATE: 20 BRPM

## 2024-07-15 DIAGNOSIS — Z23 ENCOUNTER FOR REPEAT ADMINISTRATION OF RABIES VACCINATION: Primary | ICD-10-CM

## 2024-07-15 PROCEDURE — 90471 IMMUNIZATION ADMIN: CPT

## 2024-07-15 PROCEDURE — 99283 EMERGENCY DEPT VISIT LOW MDM: CPT | Performed by: PHYSICIAN ASSISTANT

## 2024-07-15 PROCEDURE — 90675 RABIES VACCINE IM: CPT | Performed by: PHYSICIAN ASSISTANT

## 2024-07-15 RX ADMIN — RABIES VIRUS STRAIN PM-1503-3M ANTIGEN (PROPIOLACTONE INACTIVATED) AND WATER 1 ML: KIT at 15:38

## 2024-07-15 NOTE — ED PROVIDER NOTES
History  Chief Complaint   Patient presents with    Follow Up Rabies     Here for follow up rabies shot     Patient is a 38-year-old male presenting to the ED for his second rabies vaccination. Patient was initially seen in the ED on 7/12 after a dog bite to the face.  He was given rabies immunoglobulin and started the rabies vaccine series at that time.  Patient states that the wounds on his face are healing well.  He denies any fevers, chills or signs of infection at the wound site.  He denies any issues with the initial rabies vaccination.  He has no reported complaints at this time.        Prior to Admission Medications   Prescriptions Last Dose Informant Patient Reported? Taking?   amoxicillin-clavulanate (AUGMENTIN) 875-125 mg per tablet   No No   Sig: Take 1 tablet by mouth every 12 (twelve) hours for 7 days   losartan (COZAAR) 50 mg tablet   No No   Sig: Take 1 tablet (50 mg total) by mouth daily   omeprazole (PriLOSEC) 40 MG capsule   No No   Sig: Take 1 capsule (40 mg total) by mouth daily at bedtime as needed (gerd)      Facility-Administered Medications: None       Past Medical History:   Diagnosis Date    Acquired ankle/foot deformity     LAST ASSESSED 24OCT2014    Hidradenitis suppurativa     ONSET [id number]    Pes planus, congenital     LAST ASSESSED 24OCT2014    Shoulder dislocation     LAST ASSESSED 26MAR2014       Past Surgical History:   Procedure Laterality Date    BREAST SURGERY      KNEE SURGERY         Family History   Problem Relation Age of Onset    Depression Mother     Coronary artery disease Father         passed away age 29    Arthritis Family     Cancer Family         GASTRIC     Breast cancer Family     Osteoporosis Family      I have reviewed and agree with the history as documented.    E-Cigarette/Vaping    E-Cigarette Use Never User      E-Cigarette/Vaping Substances    Nicotine No     THC No     CBD No     Flavoring No     Other No     Unknown No      Social History     Tobacco Use     Smoking status: Never    Smokeless tobacco: Never   Vaping Use    Vaping status: Never Used   Substance Use Topics    Alcohol use: Yes     Comment: social    Drug use: Never       Review of Systems   Constitutional:  Negative for chills and fever.   HENT:  Negative for ear pain and sore throat.    Eyes:  Negative for pain and visual disturbance.   Respiratory:  Negative for cough and shortness of breath.    Cardiovascular:  Negative for chest pain and palpitations.   Gastrointestinal:  Negative for abdominal pain and vomiting.   Genitourinary:  Negative for dysuria and hematuria.   Musculoskeletal:  Negative for arthralgias and back pain.   Neurological:  Negative for seizures and syncope.   All other systems reviewed and are negative.      Physical Exam  Physical Exam  Vitals and nursing note reviewed.   Constitutional:       General: He is awake. He is not in acute distress.     Appearance: Normal appearance. He is well-developed. He is not ill-appearing or diaphoretic.   HENT:      Head: Normocephalic and atraumatic.      Comments: Patient wounds well-healed with no signs of infection.     Right Ear: External ear normal.      Left Ear: External ear normal.      Nose: Nose normal.      Mouth/Throat:      Lips: Pink.      Mouth: Mucous membranes are moist.   Eyes:      General: Lids are normal. No scleral icterus.     Conjunctiva/sclera: Conjunctivae normal.      Pupils: Pupils are equal, round, and reactive to light.   Cardiovascular:      Pulses: Normal pulses.           Radial pulses are 2+ on the right side and 2+ on the left side.      Heart sounds: Normal heart sounds.   Pulmonary:      Effort: Pulmonary effort is normal. No accessory muscle usage.   Abdominal:      General: Abdomen is flat. There is no distension.   Musculoskeletal:      Cervical back: Neck supple.      Right lower leg: No edema.      Left lower leg: No edema.   Skin:     General: Skin is warm and dry.      Capillary Refill: Capillary  refill takes less than 2 seconds.      Coloration: Skin is not cyanotic, jaundiced or pale.   Neurological:      Mental Status: He is alert and oriented to person, place, and time.      GCS: GCS eye subscore is 4. GCS verbal subscore is 5. GCS motor subscore is 6.      Cranial Nerves: No dysarthria or facial asymmetry.   Psychiatric:         Attention and Perception: Attention normal.         Mood and Affect: Mood normal.         Speech: Speech normal.         Behavior: Behavior normal. Behavior is cooperative.         Vital Signs  ED Triage Vitals [07/15/24 1453]   Temperature Pulse Respirations Blood Pressure SpO2   98.4 °F (36.9 °C) 63 20 142/95 96 %      Temp Source Heart Rate Source Patient Position - Orthostatic VS BP Location FiO2 (%)   Tympanic Monitor Sitting Right arm --      Pain Score       No Pain           Vitals:    07/15/24 1453   BP: 142/95   Pulse: 63   Patient Position - Orthostatic VS: Sitting         Visual Acuity      ED Medications  Medications   rabies vaccine, human diploid IM injection 1 mL (has no administration in time range)       Diagnostic Studies  Results Reviewed       None                   No orders to display              Procedures  Procedures         ED Course                                               Medical Decision Making  Patient is a 38-year-old male presenting to the ED for his second rabies vaccination.     Patient has no reported complaints.  Facial wounds are well-healed with no evidence of infection.  Patient was given his second rabies vaccination.  He was instructed to return to the ED on days 7 and 14 for repeat rabies vaccinations.    The management plan was discussed in detail with the patient at bedside and all questions were answered. Strict ED return instructions were discussed at bedside. Prior to discharge, both verbal and written instructions were provided. We discussed the signs and symptoms that should prompt the patient to return to the ED. All  questions were answered and the patient was comfortable with the plan of care and discharged home. The patient agrees to return to the Emergency Department for concerns and/or progression of illness.     Risk  Prescription drug management.                 Disposition  Final diagnoses:   Encounter for repeat administration of rabies vaccination     Time reflects when diagnosis was documented in both MDM as applicable and the Disposition within this note       Time User Action Codes Description Comment    7/15/2024  3:11 PM Mary Duran Add [Z23] Encounter for repeat administration of rabies vaccination           ED Disposition       ED Disposition   Discharge    Condition   Stable    Date/Time   Mon Jul 15, 2024  3:11 PM    Comment   Jose Granados discharge to home/self care.                   Follow-up Information       Follow up With Specialties Details Why Contact Info Additional Information    [facility] Emergency Department Emergency Medicine On 7/19/2024  [address] [facility] [address]  [phone] [facility] Emergency Department, [address] [place] [address], [phone]            Patient's Medications   Discharge Prescriptions    No medications on file       No discharge procedures on file.    PDMP Review       None            ED Provider  Electronically Signed by             Mary Duran PA-C  07/15/24 3540

## 2024-07-19 ENCOUNTER — HOSPITAL ENCOUNTER (EMERGENCY)
Facility: HOSPITAL | Age: 39
Discharge: HOME | End: 2024-07-19
Attending: STUDENT IN AN ORGANIZED HEALTH CARE EDUCATION/TRAINING PROGRAM
Payer: OTHER MISCELLANEOUS

## 2024-07-19 VITALS
HEART RATE: 67 BPM | RESPIRATION RATE: 18 BRPM | TEMPERATURE: 97.5 F | WEIGHT: 305 LBS | OXYGEN SATURATION: 96 % | DIASTOLIC BLOOD PRESSURE: 80 MMHG | HEIGHT: 72 IN | SYSTOLIC BLOOD PRESSURE: 138 MMHG | BODY MASS INDEX: 41.31 KG/M2

## 2024-07-19 DIAGNOSIS — Z23 ENCOUNTER FOR REPEAT ADMINISTRATION OF RABIES VACCINATION: Primary | ICD-10-CM

## 2024-07-19 PROCEDURE — 90675 RABIES VACCINE IM: CPT | Performed by: STUDENT IN AN ORGANIZED HEALTH CARE EDUCATION/TRAINING PROGRAM

## 2024-07-19 PROCEDURE — 99283 EMERGENCY DEPT VISIT LOW MDM: CPT | Performed by: STUDENT IN AN ORGANIZED HEALTH CARE EDUCATION/TRAINING PROGRAM

## 2024-07-19 PROCEDURE — 90471 IMMUNIZATION ADMIN: CPT

## 2024-07-19 PROCEDURE — 96372 THER/PROPH/DIAG INJ SC/IM: CPT

## 2024-07-19 RX ADMIN — RABIES VIRUS STRAIN PM-1503-3M ANTIGEN (PROPIOLACTONE INACTIVATED) AND WATER 1 ML: KIT at 11:29

## 2024-07-19 NOTE — ED PROVIDER NOTES
History  Chief Complaint   Patient presents with    Follow Up Rabies     3rd shot in series      Pt is a 37 YO M who presents to the ED for his 3rd rabies shot. Was bitten on the face by a dog. Has had no complication with regard to the bite. No fevers or chills. No other complaints or concerns.           Prior to Admission Medications   Prescriptions Last Dose Informant Patient Reported? Taking?   amoxicillin-clavulanate (AUGMENTIN) 875-125 mg per tablet   No No   Sig: Take 1 tablet by mouth every 12 (twelve) hours for 7 days   losartan (COZAAR) 50 mg tablet   No No   Sig: Take 1 tablet (50 mg total) by mouth daily   omeprazole (PriLOSEC) 40 MG capsule   No No   Sig: Take 1 capsule (40 mg total) by mouth daily at bedtime as needed (gerd)      Facility-Administered Medications: None       Past Medical History:   Diagnosis Date    Acquired ankle/foot deformity     LAST ASSESSED 24OCT2014    Hidradenitis suppurativa     ONSET [id number]    Pes planus, congenital     LAST ASSESSED 24OCT2014    Shoulder dislocation     LAST ASSESSED 26MAR2014       Past Surgical History:   Procedure Laterality Date    BREAST SURGERY      KNEE SURGERY         Family History   Problem Relation Age of Onset    Depression Mother     Coronary artery disease Father         passed away age 29    Arthritis Family     Cancer Family         GASTRIC     Breast cancer Family     Osteoporosis Family      I have reviewed and agree with the history as documented.    E-Cigarette/Vaping    E-Cigarette Use Never User      E-Cigarette/Vaping Substances    Nicotine No     THC No     CBD No     Flavoring No     Other No     Unknown No      Social History     Tobacco Use    Smoking status: Never    Smokeless tobacco: Never   Vaping Use    Vaping status: Never Used   Substance Use Topics    Alcohol use: Yes     Comment: social    Drug use: Never       Review of Systems   All other systems reviewed and are negative.      Physical Exam  Physical Exam  Vitals and  nursing note reviewed.   Constitutional:       General: He is not in acute distress.     Appearance: He is well-developed. He is not ill-appearing, toxic-appearing or diaphoretic.   HENT:      Head: Normocephalic and atraumatic.      Right Ear: External ear normal.      Left Ear: External ear normal.      Nose: Nose normal.   Eyes:      General: Lids are normal. No scleral icterus.  Cardiovascular:      Rate and Rhythm: Normal rate and regular rhythm.   Pulmonary:      Effort: Pulmonary effort is normal. No respiratory distress.   Skin:     General: Skin is warm and dry.   Neurological:      General: No focal deficit present.      Mental Status: He is alert.   Psychiatric:         Mood and Affect: Mood normal.         Behavior: Behavior normal.         Vital Signs  ED Triage Vitals   Temperature Pulse Respirations Blood Pressure SpO2   07/19/24 1110 07/19/24 1111 07/19/24 1110 07/19/24 1111 07/19/24 1111   97.5 °F (36.4 °C) 67 18 138/80 96 %      Temp src Heart Rate Source Patient Position - Orthostatic VS BP Location FiO2 (%)   -- -- -- -- --             Pain Score       07/19/24 1110       No Pain           Vitals:    07/19/24 1111   BP: 138/80   Pulse: 67         Visual Acuity      ED Medications  Medications   rabies vaccine, human diploid IM injection 1 mL (1 mL Intramuscular Given 7/19/24 1129)       Diagnostic Studies  Results Reviewed       None                   No orders to display              Procedures  Procedures         ED Course                                 SBIRT 20yo+      Flowsheet Row Most Recent Value   Initial Alcohol Screen: US AUDIT-C     1. How often do you have a drink containing alcohol? 0 Filed at: 07/19/2024 1110   2. How many drinks containing alcohol do you have on a typical day you are drinking?  0 Filed at: 07/19/2024 1110   3a. Male UNDER 65: How often do you have five or more drinks on one occasion? 0 Filed at: 07/19/2024 1110   3b. FEMALE Any Age, or MALE 65+: How often do you  "have 4 or more drinks on one occassion? 0 Filed at: 07/19/2024 1110   Audit-C Score 0 Filed at: 07/19/2024 1110   EVI: How many times in the past year have you...    Used an illegal drug or used a prescription medication for non-medical reasons? Never Filed at: 07/19/2024 1110                      Medical Decision Making  Patient is a 38 y.o. male who presents to the ED for repeat rabies vaccine. Pt is non-toxic, well appearing. Vitals are stable. Physical exam is unremarkable.    Differential includes but is not limited to: Repeat rabies vaccination.  No signs or symptoms of infection    Plan: Repeat rabies vaccination, discharge                 Portions of the record may have been created with voice recognition software. Occasional wrong word or \"sound a like\" substitutions may have occurred due to the inherent limitations of voice recognition software. Read the chart carefully and recognize, using context, where substitutions have occurred.    Risk  Prescription drug management.                 Disposition  Final diagnoses:   Encounter for repeat administration of rabies vaccination     Time reflects when diagnosis was documented in both MDM as applicable and the Disposition within this note       Time User Action Codes Description Comment    7/19/2024 11:14 AM Fernie Neal Add [Z23] Encounter for repeat administration of rabies vaccination           ED Disposition       ED Disposition   Discharge    Condition   Stable    Date/Time   Fri Jul 19, 2024 1113    Comment   Jose Granados discharge to home/self care.                   Follow-up Information       Follow up With Specialties Details Why Contact Info Additional Information    Bobby Berumen DO Family Medicine   [address] [facility] [address]  [facility] [address]  [phone]       [facility] Emergency Department Emergency Medicine In 1 week  [address] [facility] [address]  [phone] [facility] " Emergency Department, [address], [place], [address]            Discharge Medication List as of 7/19/2024 11:15 AM        CONTINUE these medications which have NOT CHANGED    Details   amoxicillin-clavulanate (AUGMENTIN) 875-125 mg per tablet Take 1 tablet by mouth every 12 (twelve) hours for 7 days, Starting Fri 7/12/2024, Until Fri 7/19/2024, Normal      losartan (COZAAR) 50 mg tablet Take 1 tablet (50 mg total) by mouth daily, Starting Tue 3/12/2024, Normal      omeprazole (PriLOSEC) 40 MG capsule Take 1 capsule (40 mg total) by mouth daily at bedtime as needed (gerd), Starting Thu 12/7/2023, Normal             No discharge procedures on file.    PDMP Review       None            ED Provider  Electronically Signed by             Fernie Neal DO  07/19/24 7088

## 2024-07-19 NOTE — DISCHARGE INSTRUCTIONS
As discussed please return for your last rabies vaccine on day 14 (7/26/2024). Return sooner for any other new or concerning symptoms.

## 2024-07-23 ENCOUNTER — OFFICE VISIT (OUTPATIENT)
Dept: FAMILY MEDICINE CLINIC | Facility: CLINIC | Age: 39
End: 2024-07-23
Payer: COMMERCIAL

## 2024-07-23 VITALS
TEMPERATURE: 96.8 F | HEART RATE: 65 BPM | SYSTOLIC BLOOD PRESSURE: 118 MMHG | OXYGEN SATURATION: 98 % | HEIGHT: 72 IN | WEIGHT: 291 LBS | BODY MASS INDEX: 39.42 KG/M2 | DIASTOLIC BLOOD PRESSURE: 78 MMHG | RESPIRATION RATE: 16 BRPM

## 2024-07-23 DIAGNOSIS — Z13.1 SCREENING FOR DIABETES MELLITUS (DM): ICD-10-CM

## 2024-07-23 DIAGNOSIS — E66.01 MORBID OBESITY (HCC): ICD-10-CM

## 2024-07-23 DIAGNOSIS — K21.9 CHRONIC GERD: ICD-10-CM

## 2024-07-23 DIAGNOSIS — Z12.5 PROSTATE CANCER SCREENING: ICD-10-CM

## 2024-07-23 DIAGNOSIS — Z13.220 SCREENING FOR LIPID DISORDERS: ICD-10-CM

## 2024-07-23 DIAGNOSIS — I10 ESSENTIAL HYPERTENSION: ICD-10-CM

## 2024-07-23 DIAGNOSIS — R73.03 PREDIABETES: ICD-10-CM

## 2024-07-23 DIAGNOSIS — Z00.00 HEALTHCARE MAINTENANCE: Primary | ICD-10-CM

## 2024-07-23 PROCEDURE — 99396 PREV VISIT EST AGE 40-64: CPT | Performed by: FAMILY MEDICINE

## 2024-07-23 RX ORDER — OMEPRAZOLE 40 MG/1
40 CAPSULE, DELAYED RELEASE ORAL
Qty: 90 CAPSULE | Refills: 1 | Status: SHIPPED | OUTPATIENT
Start: 2024-07-23

## 2024-07-23 RX ORDER — LOSARTAN POTASSIUM 50 MG/1
50 TABLET ORAL DAILY
Qty: 90 TABLET | Refills: 1 | Status: SHIPPED | OUTPATIENT
Start: 2024-07-23

## 2024-07-23 NOTE — PROGRESS NOTES
Assessment/Plan:    No problem-specific Assessment & Plan notes found for this encounter.    Cpe  Successful wt loss thus far, about 40#  Continue same  Try to do more exercise walking, with dog    Htn stable  F/u q6m    GERD  Continue PPI  Avoid triggers    Prediabetes  Watch carbs     Diagnoses and all orders for this visit:    Healthcare maintenance    Screening for lipid disorders  -     Lipid Panel with Direct LDL reflex; Future    Screening for diabetes mellitus (DM)  -     Comprehensive metabolic panel; Future    Prostate cancer screening  -     PSA, Total Screen; Future    Prediabetes  -     Hemoglobin A1C; Future    Essential hypertension  -     losartan (COZAAR) 50 mg tablet; Take 1 tablet (50 mg total) by mouth daily    Morbid obesity (HCC)    BMI 39.0-39.9,adult    Chronic GERD  -     omeprazole (PriLOSEC) 40 MG capsule; Take 1 capsule (40 mg total) by mouth daily at bedtime as needed (gerd)        Return in about 6 months (around 1/23/2025) for Recheck.    Subjective:      Patient ID: Jose Granados is a 38 y.o. male.    Chief Complaint   Patient presents with    Physical Exam     Sanna Kat MA        HPI  Here for cpe  Eating healthy  Less sugar/carbs  Water and coffee  Low calorie soda  Physical job  Taking bp meds  Ppi risks advised  Lost 40# with diet    The following portions of the patient's history were reviewed and updated as appropriate: allergies, current medications, past family history, past medical history, past social history, past surgical history and problem list.    Review of Systems   Respiratory:  Negative for cough and shortness of breath.          Current Outpatient Medications   Medication Sig Dispense Refill    losartan (COZAAR) 50 mg tablet Take 1 tablet (50 mg total) by mouth daily 90 tablet 1    omeprazole (PriLOSEC) 40 MG capsule Take 1 capsule (40 mg total) by mouth daily at bedtime as needed (gerd) 90 capsule 1     No current facility-administered medications for this  visit.       Objective:    /78   Pulse 65   Temp (!) 96.8 °F (36 °C)   Resp 16   Ht 6' (1.829 m)   Wt 132 kg (291 lb)   SpO2 98%   BMI 39.47 kg/m²        Physical Exam  Vitals and nursing note reviewed.   Constitutional:       General: He is not in acute distress.     Appearance: He is well-developed. He is obese. He is not ill-appearing.   HENT:      Head: Normocephalic.      Right Ear: Tympanic membrane and ear canal normal.      Left Ear: Tympanic membrane and ear canal normal.   Eyes:      General: No scleral icterus.     Conjunctiva/sclera: Conjunctivae normal.   Neck:      Vascular: No carotid bruit.   Cardiovascular:      Rate and Rhythm: Normal rate and regular rhythm.      Heart sounds: No murmur heard.  Pulmonary:      Effort: Pulmonary effort is normal. No respiratory distress.      Breath sounds: No wheezing.   Abdominal:      General: There is no distension.      Palpations: Abdomen is soft.      Tenderness: There is no abdominal tenderness.   Musculoskeletal:         General: No deformity.      Cervical back: Neck supple.      Right lower leg: No edema.      Left lower leg: No edema.   Skin:     General: Skin is warm and dry.      Coloration: Skin is not jaundiced or pale.   Neurological:      Mental Status: He is alert.      Motor: No weakness.      Gait: Gait normal.   Psychiatric:         Mood and Affect: Mood normal.         Behavior: Behavior normal.         Thought Content: Thought content normal.                Bobby Berumen DO

## 2024-07-26 ENCOUNTER — HOSPITAL ENCOUNTER (EMERGENCY)
Facility: HOSPITAL | Age: 39
Discharge: HOME | End: 2024-07-26
Attending: STUDENT IN AN ORGANIZED HEALTH CARE EDUCATION/TRAINING PROGRAM
Payer: OTHER MISCELLANEOUS

## 2024-07-26 VITALS
HEIGHT: 72 IN | WEIGHT: 290 LBS | RESPIRATION RATE: 18 BRPM | HEART RATE: 55 BPM | OXYGEN SATURATION: 95 % | SYSTOLIC BLOOD PRESSURE: 147 MMHG | DIASTOLIC BLOOD PRESSURE: 78 MMHG | TEMPERATURE: 97.3 F | BODY MASS INDEX: 39.28 KG/M2

## 2024-07-26 DIAGNOSIS — Z23 ENCOUNTER FOR REPEAT ADMINISTRATION OF RABIES VACCINATION: Primary | ICD-10-CM

## 2024-07-26 PROCEDURE — 90675 RABIES VACCINE IM: CPT | Performed by: STUDENT IN AN ORGANIZED HEALTH CARE EDUCATION/TRAINING PROGRAM

## 2024-07-26 PROCEDURE — 99282 EMERGENCY DEPT VISIT SF MDM: CPT | Performed by: STUDENT IN AN ORGANIZED HEALTH CARE EDUCATION/TRAINING PROGRAM

## 2024-07-26 PROCEDURE — 90471 IMMUNIZATION ADMIN: CPT

## 2024-07-26 RX ADMIN — RABIES VIRUS STRAIN PM-1503-3M ANTIGEN (PROPIOLACTONE INACTIVATED) AND WATER 1 ML: KIT at 12:56

## 2024-07-26 NOTE — ED PROVIDER NOTES
History  Chief Complaint   Patient presents with    Follow Up Rabies     Here for 4th dose of rabies vaccination      Patient is a 38-year-old male, no pertinent past medical history, who presents to the emergency department for his last rabies vaccination.  Initial injury was 7/12/2024.  He has had no complications.  No fevers.  No other complaints or concerns.          Prior to Admission Medications   Prescriptions Last Dose Informant Patient Reported? Taking?   losartan (COZAAR) 50 mg tablet   No No   Sig: Take 1 tablet (50 mg total) by mouth daily   omeprazole (PriLOSEC) 40 MG capsule   No No   Sig: Take 1 capsule (40 mg total) by mouth daily at bedtime as needed (gerd)      Facility-Administered Medications: None       Past Medical History:   Diagnosis Date    Acquired ankle/foot deformity     LAST ASSESSED 24OCT2014    Hidradenitis suppurativa     ONSET [id number]    Pes planus, congenital     LAST ASSESSED 24OCT2014    Shoulder dislocation     LAST ASSESSED 26MAR2014       Past Surgical History:   Procedure Laterality Date    BREAST SURGERY      KNEE SURGERY         Family History   Problem Relation Age of Onset    Depression Mother     Coronary artery disease Father         passed away age 29    Arthritis Family     Cancer Family         GASTRIC     Breast cancer Family     Osteoporosis Family      I have reviewed and agree with the history as documented.    E-Cigarette/Vaping    E-Cigarette Use Never User      E-Cigarette/Vaping Substances    Nicotine No     THC No     CBD No     Flavoring No     Other No     Unknown No      Social History     Tobacco Use    Smoking status: Never    Smokeless tobacco: Never   Vaping Use    Vaping status: Never Used   Substance Use Topics    Alcohol use: Yes     Comment: social    Drug use: Never       Review of Systems   All other systems reviewed and are negative.      Physical Exam  Physical Exam  Vitals and nursing note reviewed.   Constitutional:       General: He is not  in acute distress.     Appearance: He is well-developed. He is not diaphoretic.   HENT:      Head: Normocephalic and atraumatic.      Right Ear: External ear normal.      Left Ear: External ear normal.      Nose: Nose normal.   Eyes:      General: Lids are normal. No scleral icterus.  Cardiovascular:      Rate and Rhythm: Normal rate and regular rhythm.   Pulmonary:      Effort: Pulmonary effort is normal. No respiratory distress.   Skin:     General: Skin is warm and dry.   Neurological:      General: No focal deficit present.      Mental Status: He is alert.   Psychiatric:         Mood and Affect: Mood normal.         Behavior: Behavior normal.         Vital Signs  ED Triage Vitals   Temperature Pulse Respirations Blood Pressure SpO2   07/26/24 1231 07/26/24 1232 07/26/24 1231 07/26/24 1232 07/26/24 1232   (!) 97.3 °F (36.3 °C) 55 18 147/78 95 %      Temp src Heart Rate Source Patient Position - Orthostatic VS BP Location FiO2 (%)   -- -- -- -- --             Pain Score       07/26/24 1231       No Pain           Vitals:    07/26/24 1232   BP: 147/78   Pulse: 55         Visual Acuity      ED Medications  Medications   rabies vaccine, human diploid IM injection 1 mL (1 mL Intramuscular Given 7/26/24 1256)       Diagnostic Studies  Results Reviewed       None                   No orders to display              Procedures  Procedures         ED Course                                 SBIRT 20yo+      Flowsheet Row Most Recent Value   Initial Alcohol Screen: US AUDIT-C     1. How often do you have a drink containing alcohol? 0 Filed at: 07/26/2024 1231   2. How many drinks containing alcohol do you have on a typical day you are drinking?  0 Filed at: 07/26/2024 1231   3a. Male UNDER 65: How often do you have five or more drinks on one occasion? 0 Filed at: 07/26/2024 1231   3b. FEMALE Any Age, or MALE 65+: How often do you have 4 or more drinks on one occassion? 0 Filed at: 07/26/2024 1231   Audit-C Score 0 Filed at:  "07/26/2024 1231   EVI: How many times in the past year have you...    Used an illegal drug or used a prescription medication for non-medical reasons? Never Filed at: 07/26/2024 1231                      Medical Decision Making  Patient is a 38 y.o. male who presents to the ED for his last rabies shot.  Patient is nontoxic, well-appearing.  Vitals are stable..    Differential includes but is not limited to: Repeat rabies vaccination.  No signs or symptoms of rabies.    Plan: Rabies vaccination, discharge with return precautions                 Portions of the record may have been created with voice recognition software. Occasional wrong word or \"sound a like\" substitutions may have occurred due to the inherent limitations of voice recognition software. Read the chart carefully and recognize, using context, where substitutions have occurred.    Risk  Prescription drug management.                 Disposition  Final diagnoses:   Encounter for repeat administration of rabies vaccination     Time reflects when diagnosis was documented in both MDM as applicable and the Disposition within this note       Time User Action Codes Description Comment    7/26/2024 12:41 PM Fernie Neal Add [Z23] Encounter for repeat administration of rabies vaccination           ED Disposition       ED Disposition   Discharge    Condition   Stable    Date/Time   Fri Jul 26, 2024 1240    Comment   Jose Granados discharge to home/self care.                   Follow-up Information       Follow up With Specialties Details Why Contact Info Additional Information    Bobby Berumen,  Family Medicine   [address] [id number] [phone]       [facility] Emergency Department Emergency Medicine   [address] [place] [id number] [phone] [facility] Emergency Department, [address] [place], [place], [address]            Discharge Medication List as of " 7/26/2024 12:41 PM        CONTINUE these medications which have NOT CHANGED    Details   losartan (COZAAR) 50 mg tablet Take 1 tablet (50 mg total) by mouth daily, Starting Tue 7/23/2024, Normal      omeprazole (PriLOSEC) 40 MG capsule Take 1 capsule (40 mg total) by mouth daily at bedtime as needed (gerd), Starting Tue 7/23/2024, Normal             No discharge procedures on file.    PDMP Review       None            ED Provider  Electronically Signed by             Fernie Neal DO  07/26/24 4734

## 2024-08-22 PROBLEM — Z00.00 HEALTHCARE MAINTENANCE: Status: RESOLVED | Noted: 2024-07-23 | Resolved: 2024-08-22

## 2025-03-24 DIAGNOSIS — K21.9 CHRONIC GERD: ICD-10-CM

## 2025-03-24 DIAGNOSIS — I10 ESSENTIAL HYPERTENSION: ICD-10-CM

## 2025-03-25 NOTE — TELEPHONE ENCOUNTER
Refill must be reviewed and completed by the office or provider. The refill is unable to be approved or denied by the medication management team.    Last seen 07.2024 and was to return in 6M, no appointment - Please review to see if the refill is appropriate.

## 2025-03-27 ENCOUNTER — TELEPHONE (OUTPATIENT)
Age: 40
End: 2025-03-27

## 2025-03-27 RX ORDER — LOSARTAN POTASSIUM 50 MG/1
50 TABLET ORAL DAILY
Qty: 90 TABLET | Refills: 1 | Status: SHIPPED | OUTPATIENT
Start: 2025-03-27

## 2025-03-27 RX ORDER — OMEPRAZOLE 40 MG/1
40 CAPSULE, DELAYED RELEASE ORAL
Qty: 90 CAPSULE | Refills: 1 | Status: SHIPPED | OUTPATIENT
Start: 2025-03-27

## 2025-03-27 NOTE — TELEPHONE ENCOUNTER
Patient scheduled an appt for 4/8/25 and would like a refill for   losartan (COZAAR) 50 mg tablet   omeprazole (PriLOSEC) 40 MG capsule

## 2025-03-27 NOTE — TELEPHONE ENCOUNTER
Patient called the RX Refill Line. Message is being forwarded to the office.     Patient called to check on the status of his medications amlodipine and omeprazole and stated he is completely out of medication and he did schedule an appointment for 04.08 with dr pop. Patient said he would like to  his medication from the pharmacy ([facility]) today    Patient advised medications are pending approval by his doctor but I will send a high priority message to the office staff. Patient verbalized understanding.

## 2025-04-08 ENCOUNTER — OFFICE VISIT (OUTPATIENT)
Dept: FAMILY MEDICINE CLINIC | Facility: CLINIC | Age: 40
End: 2025-04-08
Payer: COMMERCIAL

## 2025-04-08 VITALS
BODY MASS INDEX: 42.66 KG/M2 | SYSTOLIC BLOOD PRESSURE: 116 MMHG | WEIGHT: 315 LBS | HEART RATE: 96 BPM | TEMPERATURE: 97.3 F | DIASTOLIC BLOOD PRESSURE: 82 MMHG | RESPIRATION RATE: 16 BRPM | HEIGHT: 72 IN

## 2025-04-08 DIAGNOSIS — G47.33 OSA (OBSTRUCTIVE SLEEP APNEA): ICD-10-CM

## 2025-04-08 DIAGNOSIS — I10 ESSENTIAL HYPERTENSION: ICD-10-CM

## 2025-04-08 DIAGNOSIS — E66.01 MORBID OBESITY (HCC): Primary | ICD-10-CM

## 2025-04-08 PROCEDURE — 99215 OFFICE O/P EST HI 40 MIN: CPT | Performed by: FAMILY MEDICINE

## 2025-04-08 NOTE — ASSESSMENT & PLAN NOTE
Prior Authorization Clinical Questions for Weight Management Pharmacotherapy    1. Does the patient have a contrainidcation to medication prescribed for weight management?: No  2. Does the patient have a diagnosis of obesity, confirmed by a BMI greater than or equal to 30 kg/m^2?: Yes  3. Does the patient have a BMI of greater than or equal to 27 kg/m^2 with at least one weight-related comorbidity/risk factor/complication (e.g. diabetes, dyslipidemia, coronary artery disease)?: Yes  5. WEGOVY CVA Indication: Does patient have established documented cardiovascular disease (history of a prior heart attack (myocardial infarction), stroke, or symptomatic peripheral arterial disease (PAD)?: No  6. ZEPBOUND LENORE Indication: Does patient have documented LENORE diagnosed via sleep study (insurance will require copy of sleep study results for approval)?: Yes  7. Has the patient been on a weight loss regimen of low-calorie diet, increased physical activity, and lifestyle modifications for a minimum of 6 months?: Yes  8. Has the patient completed a comprehensive weight loss program (ie, Weight Watchers, Noom, Bariatrics, other brennon on phone)? If so, what?: Yes   -- Q8 Further Explanation: keto   9. Does the patient have a history of type 2 diabetes?: No  10. Has the member tried and failed other weight loss medication within the past 12 months?: No  11. Will the member use requested medication in combination with another GLP agonist or weight loss drug?: No     Baseline weight (in pounds): 334 lbs     Dosage increase protocol discussed  F/u in 3m  Orders:    Semaglutide-Weight Management (WEGOVY) 0.25 MG/0.5ML; Inject 0.5 mL (0.25 mg total) under the skin once a week

## 2025-04-08 NOTE — PROGRESS NOTES
Name: Jose Granados      : 1985      MRN: [id number]  Encounter Provider: Bobby Berumen DO  Encounter Date: 2025   Encounter department: [facility]  :  Assessment & Plan  Morbid obesity (HCC)  Prior Authorization Clinical Questions for Weight Management Pharmacotherapy    1. Does the patient have a contrainidcation to medication prescribed for weight management?: No  2. Does the patient have a diagnosis of obesity, confirmed by a BMI greater than or equal to 30 kg/m^2?: Yes  3. Does the patient have a BMI of greater than or equal to 27 kg/m^2 with at least one weight-related comorbidity/risk factor/complication (e.g. diabetes, dyslipidemia, coronary artery disease)?: Yes  5. WEGOVY CVA Indication: Does patient have established documented cardiovascular disease (history of a prior heart attack (myocardial infarction), stroke, or symptomatic peripheral arterial disease (PAD)?: No  6. ZEPBOUND LENORE Indication: Does patient have documented LENORE diagnosed via sleep study (insurance will require copy of sleep study results for approval)?: Yes  7. Has the patient been on a weight loss regimen of low-calorie diet, increased physical activity, and lifestyle modifications for a minimum of 6 months?: Yes  8. Has the patient completed a comprehensive weight loss program (ie, Weight Watchers, Noom, Bariatrics, other brennon on phone)? If so, what?: Yes   -- Q8 Further Explanation: keto   9. Does the patient have a history of type 2 diabetes?: No  10. Has the member tried and failed other weight loss medication within the past 12 months?: No  11. Will the member use requested medication in combination with another GLP agonist or weight loss drug?: No     Baseline weight (in pounds): 334 lbs     Dosage increase protocol discussed  F/u in 3m  Orders:    Semaglutide-Weight Management (WEGOVY) 0.25 MG/0.5ML; Inject 0.5 mL (0.25 mg total) under the skin once a week    Essential hypertension  Stable on meds        LENORE (obstructive sleep apnea)  Has per pt          History of Present Illness   HPI  Feels worse w/o bp meds  Will feel flushed, no cp, no dizziness w/o meds  Not eating healthy  Walks dog  Has exercise  Interested in wt loss meds    Review of Systems   Respiratory:  Negative for shortness of breath.    Cardiovascular:  Negative for chest pain.     Family History   Problem Relation Age of Onset    Depression Mother     Coronary artery disease Father         passed away age 29    Arthritis Family     Cancer Family         GASTRIC     Breast cancer Family     Osteoporosis Family       Social History     Tobacco Use    Smoking status: Never    Smokeless tobacco: Never   Vaping Use    Vaping status: Never Used   Substance Use Topics    Alcohol use: Yes     Comment: social    Drug use: Never      Current Outpatient Medications   Medication Instructions    losartan (COZAAR) 50 mg, Oral, Daily    omeprazole (PRILOSEC) 40 mg, Oral, Daily at bedtime PRN    Semaglutide-Weight Management (WEGOVY) 0.25 mg, Subcutaneous, Weekly     >>>>>>>>  Return in about 3 months (around 7/8/2025) for Recheck.  >>>>>>>>    [POCT]  No results found for this or any previous visit (from the past 24 hours).    Visit Vitals  /82   Pulse 96   Temp (!) 97.3 °F (36.3 °C)   Resp 16   Ht 6' (1.829 m)   Wt (!) 151 kg (333 lb)   BMI 45.16 kg/m²   Smoking Status Never   BSA 2.65 m²        Objective   /82   Pulse 96   Temp (!) 97.3 °F (36.3 °C)   Resp 16   Ht 6' (1.829 m)   Wt (!) 151 kg (333 lb)   BMI 45.16 kg/m²      Physical Exam  Vitals and nursing note reviewed.   Constitutional:       General: He is not in acute distress.     Appearance: He is well-developed. He is not ill-appearing.   HENT:      Head: Normocephalic.      Right Ear: Tympanic membrane normal.      Left Ear: Tympanic membrane normal.   Eyes:      General: No scleral icterus.     Conjunctiva/sclera: Conjunctivae normal.   Cardiovascular:      Rate and Rhythm:  Normal rate and regular rhythm.      Heart sounds: No murmur heard.  Pulmonary:      Effort: Pulmonary effort is normal. No respiratory distress.      Breath sounds: No wheezing.   Abdominal:      Palpations: Abdomen is soft.      Tenderness: There is no abdominal tenderness.   Musculoskeletal:         General: No deformity.      Cervical back: Neck supple.      Left lower leg: No edema.   Skin:     General: Skin is warm and dry.      Coloration: Skin is not jaundiced or pale.   Neurological:      Mental Status: He is alert.      Motor: No weakness.      Gait: Gait normal.   Psychiatric:         Mood and Affect: Mood normal.         Behavior: Behavior normal.         Thought Content: Thought content normal.       Administrative Statements   I have spent a total time of 41 minutes in caring for this patient on the day of the visit/encounter including Risks and benefits of tx options, Instructions for management, Patient and family education, Importance of tx compliance, Risk factor reductions, Documenting in the medical record, and Obtaining or reviewing history  .

## 2025-04-09 ENCOUNTER — TELEPHONE (OUTPATIENT)
Dept: FAMILY MEDICINE CLINIC | Facility: CLINIC | Age: 40
End: 2025-04-09

## 2025-04-09 ENCOUNTER — TELEPHONE (OUTPATIENT)
Age: 40
End: 2025-04-09

## 2025-04-09 NOTE — TELEPHONE ENCOUNTER
WESLEY STERLING 0.25 MG/0.5ML SUBMITTED     to [employer] [place]     via    []CMM-KEY:    [x]Surescripts   []Availity-Auth ID #  NDC #    []Faxed to plan   []Other website    []Phone call Case ID #      []PA sent as URGENT    All office notes, labs and other pertaining documents and studies sent. Clinical questions answered. Awaiting determination from insurance company.     Turnaround time for your insurance to make a decision on your Prior Authorization can take 7-21 business days.

## 2025-04-09 NOTE — TELEPHONE ENCOUNTER
WESLEY ARIASVY 0.25 MG/0.5ML APPROVED         Patient advised by          []MyChart Message  []Phone call   [x]LMOM  []L/M to call office as no active Communication consent on file  []Unable to leave detailed message as VM not approved on Communication consent       Pharmacy advised by    [x]Fax  []Phone call  []Secure Chat    Specialty Pharmacy    []

## 2025-05-04 DIAGNOSIS — E66.01 MORBID OBESITY (HCC): ICD-10-CM

## 2025-05-06 RX ORDER — SEMAGLUTIDE 0.5 MG/.5ML
INJECTION, SOLUTION SUBCUTANEOUS
Qty: 2 ML | Refills: 0 | Status: SHIPPED | OUTPATIENT
Start: 2025-05-06

## 2025-06-01 DIAGNOSIS — E66.01 MORBID OBESITY (HCC): ICD-10-CM

## 2025-06-01 RX ORDER — SEMAGLUTIDE 1 MG/.5ML
INJECTION, SOLUTION SUBCUTANEOUS
Qty: 2 ML | Refills: 0 | Status: SHIPPED | OUTPATIENT
Start: 2025-06-01

## 2025-06-07 DIAGNOSIS — E66.01 MORBID OBESITY (HCC): ICD-10-CM

## 2025-06-10 RX ORDER — SEMAGLUTIDE 1 MG/.5ML
INJECTION, SOLUTION SUBCUTANEOUS
Qty: 2 ML | Refills: 0 | OUTPATIENT
Start: 2025-06-10

## 2025-07-03 ENCOUNTER — RA CDI HCC (OUTPATIENT)
Dept: OTHER | Facility: HOSPITAL | Age: 40
End: 2025-07-03

## 2025-07-03 NOTE — PROGRESS NOTES
HCC coding opportunities       Chart reviewed, no opportunity found: CHART REVIEWED, NO OPPORTUNITY FOUND        Patients Insurance        Commercial Insurance: [employer]